# Patient Record
Sex: MALE | Race: WHITE | Employment: OTHER | URBAN - METROPOLITAN AREA
[De-identification: names, ages, dates, MRNs, and addresses within clinical notes are randomized per-mention and may not be internally consistent; named-entity substitution may affect disease eponyms.]

---

## 2021-05-21 ENCOUNTER — APPOINTMENT (OUTPATIENT)
Dept: CT IMAGING | Age: 71
DRG: 853 | End: 2021-05-21
Payer: MEDICARE

## 2021-05-21 ENCOUNTER — HOSPITAL ENCOUNTER (EMERGENCY)
Age: 71
Discharge: HOME | DRG: 853 | End: 2021-05-21
Attending: EMERGENCY MEDICINE
Payer: MEDICARE

## 2021-05-21 VITALS
HEIGHT: 70 IN | DIASTOLIC BLOOD PRESSURE: 63 MMHG | HEART RATE: 107 BPM | BODY MASS INDEX: 33.79 KG/M2 | WEIGHT: 236 LBS | RESPIRATION RATE: 16 BRPM | SYSTOLIC BLOOD PRESSURE: 119 MMHG | TEMPERATURE: 98.6 F | OXYGEN SATURATION: 96 %

## 2021-05-21 DIAGNOSIS — E86.0 DEHYDRATION: Primary | ICD-10-CM

## 2021-05-21 DIAGNOSIS — T67.5XXA HEAT EXHAUSTION, INITIAL ENCOUNTER: ICD-10-CM

## 2021-05-21 LAB
ANION GAP SERPL CALCULATED.3IONS-SCNC: 13 MMOL/L (ref 7–16)
ANISOCYTOSIS: ABNORMAL
BASOPHILS ABSOLUTE: 0.01 E9/L (ref 0–0.2)
BASOPHILS RELATIVE PERCENT: 0.2 % (ref 0–2)
BUN BLDV-MCNC: 28 MG/DL (ref 6–23)
CALCIUM SERPL-MCNC: 8.7 MG/DL (ref 8.6–10.2)
CHLORIDE BLD-SCNC: 101 MMOL/L (ref 98–107)
CO2: 22 MMOL/L (ref 22–29)
CREAT SERPL-MCNC: 1 MG/DL (ref 0.7–1.2)
EOSINOPHILS ABSOLUTE: 0.01 E9/L (ref 0.05–0.5)
EOSINOPHILS RELATIVE PERCENT: 0.2 % (ref 0–6)
GFR AFRICAN AMERICAN: >60
GFR NON-AFRICAN AMERICAN: >60 ML/MIN/1.73
GLUCOSE BLD-MCNC: 253 MG/DL (ref 74–99)
HCT VFR BLD CALC: 34.3 % (ref 37–54)
HEMOGLOBIN: 10.3 G/DL (ref 12.5–16.5)
IMMATURE GRANULOCYTES #: 0.03 E9/L
IMMATURE GRANULOCYTES %: 0.5 % (ref 0–5)
LYMPHOCYTES ABSOLUTE: 0.22 E9/L (ref 1.5–4)
LYMPHOCYTES RELATIVE PERCENT: 3.8 % (ref 20–42)
MCH RBC QN AUTO: 23.5 PG (ref 26–35)
MCHC RBC AUTO-ENTMCNC: 30 % (ref 32–34.5)
MCV RBC AUTO: 78.3 FL (ref 80–99.9)
MONOCYTES ABSOLUTE: 0.1 E9/L (ref 0.1–0.95)
MONOCYTES RELATIVE PERCENT: 1.7 % (ref 2–12)
NEUTROPHILS ABSOLUTE: 5.45 E9/L (ref 1.8–7.3)
NEUTROPHILS RELATIVE PERCENT: 93.6 % (ref 43–80)
OVALOCYTES: ABNORMAL
PDW BLD-RTO: 17.5 FL (ref 11.5–15)
PLATELET # BLD: 120 E9/L (ref 130–450)
PMV BLD AUTO: 9.3 FL (ref 7–12)
POIKILOCYTES: ABNORMAL
POLYCHROMASIA: ABNORMAL
POTASSIUM REFLEX MAGNESIUM: 3.7 MMOL/L (ref 3.5–5)
RBC # BLD: 4.38 E12/L (ref 3.8–5.8)
SODIUM BLD-SCNC: 136 MMOL/L (ref 132–146)
TROPONIN: <0.01 NG/ML (ref 0–0.03)
WBC # BLD: 5.8 E9/L (ref 4.5–11.5)

## 2021-05-21 PROCEDURE — 80048 BASIC METABOLIC PNL TOTAL CA: CPT

## 2021-05-21 PROCEDURE — 36415 COLL VENOUS BLD VENIPUNCTURE: CPT

## 2021-05-21 PROCEDURE — 85025 COMPLETE CBC W/AUTO DIFF WBC: CPT

## 2021-05-21 PROCEDURE — 93005 ELECTROCARDIOGRAM TRACING: CPT | Performed by: NURSE PRACTITIONER

## 2021-05-21 PROCEDURE — 2580000003 HC RX 258: Performed by: EMERGENCY MEDICINE

## 2021-05-21 PROCEDURE — 70450 CT HEAD/BRAIN W/O DYE: CPT

## 2021-05-21 PROCEDURE — 84484 ASSAY OF TROPONIN QUANT: CPT

## 2021-05-21 RX ORDER — TAMSULOSIN HYDROCHLORIDE 0.4 MG/1
CAPSULE ORAL
COMMUNITY
Start: 2021-05-02

## 2021-05-21 RX ORDER — LEVOTHYROXINE SODIUM 0.2 MG/1
TABLET ORAL
COMMUNITY
Start: 2021-03-21

## 2021-05-21 RX ORDER — BLOOD SUGAR DIAGNOSTIC
STRIP MISCELLANEOUS
COMMUNITY
Start: 2021-03-21

## 2021-05-21 RX ORDER — 0.9 % SODIUM CHLORIDE 0.9 %
1000 INTRAVENOUS SOLUTION INTRAVENOUS ONCE
Status: COMPLETED | OUTPATIENT
Start: 2021-05-21 | End: 2021-05-21

## 2021-05-21 RX ORDER — ATORVASTATIN CALCIUM 20 MG/1
TABLET, FILM COATED ORAL
COMMUNITY
Start: 2021-04-08

## 2021-05-21 RX ORDER — METFORMIN HYDROCHLORIDE 500 MG/1
TABLET, EXTENDED RELEASE ORAL
COMMUNITY
Start: 2021-02-26

## 2021-05-21 RX ORDER — RAMIPRIL 1.25 MG/1
CAPSULE ORAL
COMMUNITY
Start: 2021-05-09

## 2021-05-21 RX ORDER — LANCETS
EACH MISCELLANEOUS
COMMUNITY
Start: 2021-03-21

## 2021-05-21 RX ORDER — NITROGLYCERIN 0.4 MG/1
0.4 TABLET SUBLINGUAL
COMMUNITY

## 2021-05-21 RX ORDER — ASPIRIN 81 MG/1
81 TABLET ORAL DAILY
COMMUNITY

## 2021-05-21 RX ADMIN — SODIUM CHLORIDE 1000 ML: 9 INJECTION, SOLUTION INTRAVENOUS at 19:05

## 2021-05-21 ASSESSMENT — ENCOUNTER SYMPTOMS
COUGH: 0
SHORTNESS OF BREATH: 0
DIARRHEA: 0
ABDOMINAL PAIN: 0
ALLERGIC/IMMUNOLOGIC NEGATIVE: 1
SORE THROAT: 0
VOMITING: 0
EYE PAIN: 0
BACK PAIN: 0

## 2021-05-21 NOTE — ED NOTES
FIRST PROVIDER CONTACT ASSESSMENT NOTE      Department of Emergency Medicine   5/21/21  5:48 PM EDT    Chief Complaint: Loss of Consciousness (states LOC around 14:30 after spending several hours in sun today )      History of Present Illness:   Viral Araya is a 79 y.o. male who presents to the ED for syncopal episode. He was working outside for several hours. He states he fell in the yard. Unknown head strike. Patient's family member in triage states he was somewhat confused. He is alert and oriented x4 in triage. Denies any complaints besides being tired. Medical History:  has no past medical history on file. Surgical History:  has no past surgical history on file. Social History:    Family History: family history is not on file. *ALLERGIES*     Patient has no known allergies.      Physical Exam:      VS:  BP (!) 106/57   Pulse 107   Temp 98.6 °F (37 °C) (Temporal)   Resp 16   Ht 5' 9.5\" (1.765 m)   Wt 236 lb (107 kg)   SpO2 95%   BMI 34.35 kg/m²      Initial Plan of Care:  Initiate Treatment-Testing, Proceed toTreatment Area When Bed Available for ED Attending/MLP to Continue Care    -----------------END OF FIRST PROVIDER CONTACT ASSESSMENT NOTE--------------  Electronically signed by ANABELL Wasserman CNP   DD: 5/21/21             ANABELL Wasserman CNP  05/21/21 1749

## 2021-05-22 LAB
EKG ATRIAL RATE: 103 BPM
EKG P AXIS: 48 DEGREES
EKG P-R INTERVAL: 190 MS
EKG Q-T INTERVAL: 324 MS
EKG QRS DURATION: 80 MS
EKG QTC CALCULATION (BAZETT): 424 MS
EKG R AXIS: 7 DEGREES
EKG T AXIS: 39 DEGREES
EKG VENTRICULAR RATE: 103 BPM

## 2021-05-22 PROCEDURE — 93010 ELECTROCARDIOGRAM REPORT: CPT | Performed by: INTERNAL MEDICINE

## 2021-05-23 ENCOUNTER — APPOINTMENT (OUTPATIENT)
Dept: CT IMAGING | Age: 71
DRG: 853 | End: 2021-05-23
Payer: MEDICARE

## 2021-05-23 ENCOUNTER — APPOINTMENT (OUTPATIENT)
Dept: GENERAL RADIOLOGY | Age: 71
DRG: 853 | End: 2021-05-23
Payer: MEDICARE

## 2021-05-23 ENCOUNTER — HOSPITAL ENCOUNTER (INPATIENT)
Age: 71
LOS: 4 days | Discharge: HOME | DRG: 853 | End: 2021-05-27
Attending: EMERGENCY MEDICINE | Admitting: INTERNAL MEDICINE
Payer: MEDICARE

## 2021-05-23 ENCOUNTER — APPOINTMENT (OUTPATIENT)
Dept: ULTRASOUND IMAGING | Age: 71
DRG: 853 | End: 2021-05-23
Payer: MEDICARE

## 2021-05-23 DIAGNOSIS — K85.10 GALLSTONE PANCREATITIS: ICD-10-CM

## 2021-05-23 DIAGNOSIS — K81.9 CHOLECYSTITIS: Primary | ICD-10-CM

## 2021-05-23 LAB
ACETAMINOPHEN LEVEL: <5 MCG/ML (ref 10–30)
ALBUMIN SERPL-MCNC: 3.5 G/DL (ref 3.5–5.2)
ALP BLD-CCNC: 263 U/L (ref 40–129)
ALT SERPL-CCNC: 376 U/L (ref 0–40)
AMMONIA: 20.5 UMOL/L (ref 16–60)
ANION GAP SERPL CALCULATED.3IONS-SCNC: 8 MMOL/L (ref 7–16)
ANISOCYTOSIS: ABNORMAL
AST SERPL-CCNC: 459 U/L (ref 0–39)
BACTERIA: ABNORMAL /HPF
BASOPHILS ABSOLUTE: 0 E9/L (ref 0–0.2)
BASOPHILS RELATIVE PERCENT: 0 % (ref 0–2)
BILIRUB SERPL-MCNC: 1.8 MG/DL (ref 0–1.2)
BILIRUBIN DIRECT: 1.3 MG/DL (ref 0–0.3)
BILIRUBIN URINE: ABNORMAL
BILIRUBIN, INDIRECT: 0.5 MG/DL (ref 0–1)
BLOOD, URINE: ABNORMAL
BUN BLDV-MCNC: 25 MG/DL (ref 6–23)
BURR CELLS: ABNORMAL
CALCIUM SERPL-MCNC: 9 MG/DL (ref 8.6–10.2)
CHLORIDE BLD-SCNC: 103 MMOL/L (ref 98–107)
CHOLESTEROL, TOTAL: 86 MG/DL (ref 0–199)
CLARITY: CLEAR
CO2: 23 MMOL/L (ref 22–29)
COLOR: YELLOW
CREAT SERPL-MCNC: 0.9 MG/DL (ref 0.7–1.2)
EOSINOPHILS ABSOLUTE: 0.14 E9/L (ref 0.05–0.5)
EOSINOPHILS RELATIVE PERCENT: 1.7 % (ref 0–6)
ETHANOL: <10 MG/DL (ref 0–0.08)
GFR AFRICAN AMERICAN: >60
GFR NON-AFRICAN AMERICAN: >60 ML/MIN/1.73
GLUCOSE BLD-MCNC: 215 MG/DL (ref 74–99)
GLUCOSE URINE: 250 MG/DL
HBA1C MFR BLD: 7.4 % (ref 4–5.6)
HCT VFR BLD CALC: 35.8 % (ref 37–54)
HDLC SERPL-MCNC: 26 MG/DL
HEMOGLOBIN: 10.9 G/DL (ref 12.5–16.5)
KETONES, URINE: ABNORMAL MG/DL
LACTIC ACID: 2.9 MMOL/L (ref 0.5–2.2)
LDL CHOLESTEROL CALCULATED: 47 MG/DL (ref 0–99)
LEUKOCYTE ESTERASE, URINE: NEGATIVE
LIPASE: 517 U/L (ref 13–60)
LYMPHOCYTES ABSOLUTE: 0.34 E9/L (ref 1.5–4)
LYMPHOCYTES RELATIVE PERCENT: 3.5 % (ref 20–42)
MCH RBC QN AUTO: 23.4 PG (ref 26–35)
MCHC RBC AUTO-ENTMCNC: 30.4 % (ref 32–34.5)
MCV RBC AUTO: 76.8 FL (ref 80–99.9)
MONOCYTES ABSOLUTE: 0 E9/L (ref 0.1–0.95)
MONOCYTES RELATIVE PERCENT: 0 % (ref 2–12)
NEUTROPHILS ABSOLUTE: 8.08 E9/L (ref 1.8–7.3)
NEUTROPHILS RELATIVE PERCENT: 94.8 % (ref 43–80)
NITRITE, URINE: NEGATIVE
NUCLEATED RED BLOOD CELLS: 0 /100 WBC
OVALOCYTES: ABNORMAL
PDW BLD-RTO: 18 FL (ref 11.5–15)
PH UA: 6 (ref 5–9)
PLATELET # BLD: 101 E9/L (ref 130–450)
PMV BLD AUTO: 9.9 FL (ref 7–12)
POIKILOCYTES: ABNORMAL
POLYCHROMASIA: ABNORMAL
POTASSIUM SERPL-SCNC: 3.8 MMOL/L (ref 3.5–5)
PROTEIN UA: 30 MG/DL
RBC # BLD: 4.66 E12/L (ref 3.8–5.8)
RBC UA: ABNORMAL /HPF (ref 0–2)
SALICYLATE, SERUM: <0.3 MG/DL (ref 0–30)
SARS-COV-2, NAAT: NOT DETECTED
SCHISTOCYTES: ABNORMAL
SODIUM BLD-SCNC: 134 MMOL/L (ref 132–146)
SPECIFIC GRAVITY UA: 1.02 (ref 1–1.03)
T4 FREE: 1.06 NG/DL (ref 0.93–1.7)
TOTAL CK: 134 U/L (ref 20–200)
TOTAL PROTEIN: 6.3 G/DL (ref 6.4–8.3)
TRICYCLIC ANTIDEPRESSANTS SCREEN SERUM: NEGATIVE NG/ML
TRIGL SERPL-MCNC: 65 MG/DL (ref 0–149)
TROPONIN: <0.01 NG/ML (ref 0–0.03)
TSH SERPL DL<=0.05 MIU/L-ACNC: 2 UIU/ML (ref 0.27–4.2)
UROBILINOGEN, URINE: >=8 E.U./DL
VLDLC SERPL CALC-MCNC: 13 MG/DL
WBC # BLD: 8.5 E9/L (ref 4.5–11.5)
WBC UA: ABNORMAL /HPF (ref 0–5)

## 2021-05-23 PROCEDURE — 87088 URINE BACTERIA CULTURE: CPT

## 2021-05-23 PROCEDURE — 87635 SARS-COV-2 COVID-19 AMP PRB: CPT

## 2021-05-23 PROCEDURE — 80307 DRUG TEST PRSMV CHEM ANLYZR: CPT

## 2021-05-23 PROCEDURE — 76705 ECHO EXAM OF ABDOMEN: CPT

## 2021-05-23 PROCEDURE — 2500000003 HC RX 250 WO HCPCS: Performed by: EMERGENCY MEDICINE

## 2021-05-23 PROCEDURE — 6360000002 HC RX W HCPCS: Performed by: EMERGENCY MEDICINE

## 2021-05-23 PROCEDURE — 87040 BLOOD CULTURE FOR BACTERIA: CPT

## 2021-05-23 PROCEDURE — 80061 LIPID PANEL: CPT

## 2021-05-23 PROCEDURE — 2500000003 HC RX 250 WO HCPCS: Performed by: INTERNAL MEDICINE

## 2021-05-23 PROCEDURE — 2580000003 HC RX 258: Performed by: EMERGENCY MEDICINE

## 2021-05-23 PROCEDURE — 84439 ASSAY OF FREE THYROXINE: CPT

## 2021-05-23 PROCEDURE — 99223 1ST HOSP IP/OBS HIGH 75: CPT | Performed by: INTERNAL MEDICINE

## 2021-05-23 PROCEDURE — 1200000000 HC SEMI PRIVATE

## 2021-05-23 PROCEDURE — 87150 DNA/RNA AMPLIFIED PROBE: CPT

## 2021-05-23 PROCEDURE — 6370000000 HC RX 637 (ALT 250 FOR IP): Performed by: INTERNAL MEDICINE

## 2021-05-23 PROCEDURE — 99284 EMERGENCY DEPT VISIT MOD MDM: CPT

## 2021-05-23 PROCEDURE — 71045 X-RAY EXAM CHEST 1 VIEW: CPT

## 2021-05-23 PROCEDURE — 80179 DRUG ASSAY SALICYLATE: CPT

## 2021-05-23 PROCEDURE — 80053 COMPREHEN METABOLIC PANEL: CPT

## 2021-05-23 PROCEDURE — 82077 ASSAY SPEC XCP UR&BREATH IA: CPT

## 2021-05-23 PROCEDURE — 96374 THER/PROPH/DIAG INJ IV PUSH: CPT

## 2021-05-23 PROCEDURE — 87077 CULTURE AEROBIC IDENTIFY: CPT

## 2021-05-23 PROCEDURE — 6360000004 HC RX CONTRAST MEDICATION: Performed by: RADIOLOGY

## 2021-05-23 PROCEDURE — 82550 ASSAY OF CK (CPK): CPT

## 2021-05-23 PROCEDURE — 82248 BILIRUBIN DIRECT: CPT

## 2021-05-23 PROCEDURE — 84484 ASSAY OF TROPONIN QUANT: CPT

## 2021-05-23 PROCEDURE — 87186 SC STD MICRODIL/AGAR DIL: CPT

## 2021-05-23 PROCEDURE — 82140 ASSAY OF AMMONIA: CPT

## 2021-05-23 PROCEDURE — 70450 CT HEAD/BRAIN W/O DYE: CPT

## 2021-05-23 PROCEDURE — 85025 COMPLETE CBC W/AUTO DIFF WBC: CPT

## 2021-05-23 PROCEDURE — 83605 ASSAY OF LACTIC ACID: CPT

## 2021-05-23 PROCEDURE — 83690 ASSAY OF LIPASE: CPT

## 2021-05-23 PROCEDURE — 84443 ASSAY THYROID STIM HORMONE: CPT

## 2021-05-23 PROCEDURE — 83036 HEMOGLOBIN GLYCOSYLATED A1C: CPT

## 2021-05-23 PROCEDURE — 80143 DRUG ASSAY ACETAMINOPHEN: CPT

## 2021-05-23 PROCEDURE — 81001 URINALYSIS AUTO W/SCOPE: CPT

## 2021-05-23 PROCEDURE — APPSS180 APP SPLIT SHARED TIME > 60 MINUTES: Performed by: NURSE PRACTITIONER

## 2021-05-23 PROCEDURE — 74177 CT ABD & PELVIS W/CONTRAST: CPT

## 2021-05-23 PROCEDURE — 2580000003 HC RX 258: Performed by: INTERNAL MEDICINE

## 2021-05-23 RX ORDER — RAMIPRIL 1.25 MG/1
1.25 CAPSULE ORAL DAILY
Status: DISCONTINUED | OUTPATIENT
Start: 2021-05-23 | End: 2021-05-27 | Stop reason: HOSPADM

## 2021-05-23 RX ORDER — ACETAMINOPHEN 325 MG/1
650 TABLET ORAL EVERY 6 HOURS PRN
Status: DISCONTINUED | OUTPATIENT
Start: 2021-05-23 | End: 2021-05-27 | Stop reason: HOSPADM

## 2021-05-23 RX ORDER — POLYETHYLENE GLYCOL 3350 17 G/17G
17 POWDER, FOR SOLUTION ORAL DAILY PRN
Status: DISCONTINUED | OUTPATIENT
Start: 2021-05-23 | End: 2021-05-27 | Stop reason: HOSPADM

## 2021-05-23 RX ORDER — ACETAMINOPHEN 650 MG/1
650 SUPPOSITORY RECTAL EVERY 6 HOURS PRN
Status: DISCONTINUED | OUTPATIENT
Start: 2021-05-23 | End: 2021-05-27 | Stop reason: HOSPADM

## 2021-05-23 RX ORDER — TAMSULOSIN HYDROCHLORIDE 0.4 MG/1
0.4 CAPSULE ORAL DAILY
Status: DISCONTINUED | OUTPATIENT
Start: 2021-05-23 | End: 2021-05-27 | Stop reason: HOSPADM

## 2021-05-23 RX ORDER — 0.9 % SODIUM CHLORIDE 0.9 %
1000 INTRAVENOUS SOLUTION INTRAVENOUS ONCE
Status: COMPLETED | OUTPATIENT
Start: 2021-05-23 | End: 2021-05-23

## 2021-05-23 RX ORDER — SODIUM CHLORIDE 9 MG/ML
25 INJECTION, SOLUTION INTRAVENOUS PRN
Status: DISCONTINUED | OUTPATIENT
Start: 2021-05-23 | End: 2021-05-27 | Stop reason: HOSPADM

## 2021-05-23 RX ORDER — LEVOTHYROXINE SODIUM 0.1 MG/1
200 TABLET ORAL DAILY
Status: DISCONTINUED | OUTPATIENT
Start: 2021-05-23 | End: 2021-05-27 | Stop reason: HOSPADM

## 2021-05-23 RX ORDER — SODIUM CHLORIDE 0.9 % (FLUSH) 0.9 %
5-40 SYRINGE (ML) INJECTION EVERY 12 HOURS SCHEDULED
Status: DISCONTINUED | OUTPATIENT
Start: 2021-05-23 | End: 2021-05-27 | Stop reason: HOSPADM

## 2021-05-23 RX ORDER — SODIUM CHLORIDE 0.9 % (FLUSH) 0.9 %
5-40 SYRINGE (ML) INJECTION PRN
Status: DISCONTINUED | OUTPATIENT
Start: 2021-05-23 | End: 2021-05-27 | Stop reason: HOSPADM

## 2021-05-23 RX ORDER — PROMETHAZINE HYDROCHLORIDE 25 MG/1
12.5 TABLET ORAL EVERY 6 HOURS PRN
Status: DISCONTINUED | OUTPATIENT
Start: 2021-05-23 | End: 2021-05-27 | Stop reason: HOSPADM

## 2021-05-23 RX ORDER — ONDANSETRON 2 MG/ML
4 INJECTION INTRAMUSCULAR; INTRAVENOUS EVERY 6 HOURS PRN
Status: DISCONTINUED | OUTPATIENT
Start: 2021-05-23 | End: 2021-05-27 | Stop reason: HOSPADM

## 2021-05-23 RX ORDER — DEXTROSE, SODIUM CHLORIDE, AND POTASSIUM CHLORIDE 5; .45; .15 G/100ML; G/100ML; G/100ML
INJECTION INTRAVENOUS CONTINUOUS
Status: DISPENSED | OUTPATIENT
Start: 2021-05-23 | End: 2021-05-24

## 2021-05-23 RX ADMIN — TAMSULOSIN HYDROCHLORIDE 0.4 MG: 0.4 CAPSULE ORAL at 11:51

## 2021-05-23 RX ADMIN — Medication 10 ML: at 11:52

## 2021-05-23 RX ADMIN — METRONIDAZOLE 500 MG: 500 INJECTION, SOLUTION INTRAVENOUS at 08:22

## 2021-05-23 RX ADMIN — SODIUM CHLORIDE 1000 ML: 9 INJECTION, SOLUTION INTRAVENOUS at 05:14

## 2021-05-23 RX ADMIN — METOPROLOL TARTRATE 25 MG: 25 TABLET, FILM COATED ORAL at 21:23

## 2021-05-23 RX ADMIN — IOPAMIDOL 75 ML: 755 INJECTION, SOLUTION INTRAVENOUS at 05:45

## 2021-05-23 RX ADMIN — METOPROLOL TARTRATE 25 MG: 25 TABLET, FILM COATED ORAL at 11:52

## 2021-05-23 RX ADMIN — CEFTRIAXONE 1000 MG: 1 INJECTION, POWDER, FOR SOLUTION INTRAMUSCULAR; INTRAVENOUS at 07:31

## 2021-05-23 RX ADMIN — ACETAMINOPHEN 650 MG: 325 TABLET ORAL at 11:49

## 2021-05-23 RX ADMIN — ACETAMINOPHEN 650 MG: 325 TABLET ORAL at 18:43

## 2021-05-23 RX ADMIN — RAMIPRIL 1.25 MG: 1.25 CAPSULE ORAL at 11:58

## 2021-05-23 RX ADMIN — LEVOTHYROXINE SODIUM 200 MCG: 100 TABLET ORAL at 11:50

## 2021-05-23 RX ADMIN — POTASSIUM CHLORIDE, DEXTROSE MONOHYDRATE AND SODIUM CHLORIDE: 150; 5; 450 INJECTION, SOLUTION INTRAVENOUS at 10:21

## 2021-05-23 ASSESSMENT — PAIN DESCRIPTION - ORIENTATION: ORIENTATION: MID

## 2021-05-23 ASSESSMENT — PAIN DESCRIPTION - FREQUENCY: FREQUENCY: INTERMITTENT

## 2021-05-23 ASSESSMENT — PAIN SCALES - GENERAL
PAINLEVEL_OUTOF10: 0
PAINLEVEL_OUTOF10: 5

## 2021-05-23 ASSESSMENT — PAIN DESCRIPTION - LOCATION: LOCATION: ABDOMEN

## 2021-05-23 ASSESSMENT — PAIN DESCRIPTION - PROGRESSION: CLINICAL_PROGRESSION: NOT CHANGED

## 2021-05-23 ASSESSMENT — PAIN DESCRIPTION - PAIN TYPE: TYPE: ACUTE PAIN

## 2021-05-23 ASSESSMENT — PAIN DESCRIPTION - ONSET: ONSET: ON-GOING

## 2021-05-23 ASSESSMENT — PAIN DESCRIPTION - DESCRIPTORS: DESCRIPTORS: CRAMPING;CRUSHING;DISCOMFORT

## 2021-05-23 NOTE — H&P
[facility] CARE AT [facility]   History and Physical      CHIEF COMPLAINT: Abdominal discomfort    History of Present Illness:  79 y.o. male with a history of obesity, patient was planned for gastric bypass which he had about 26 years ago, at that time he also had placement of IVC filter, he did have evidence of DVT which was treated and underwent surgery 3 months later, smoker until 1985, also with diabetes, hypertension, hypothyroidism, did have CABG 3 years ago, he also had needed stent for his PVD on the left side, originally from [facility], presented to ED 2 days ago, at that time he had mowed lawn and he was diagnosed with heat exhaustion, treated and improved with IV fluids. Did have difficulty yesterday, more after dinner, again came back to ER today, work-up had shown evidence of cholelithiasis, renal stones, bladder stones, evidence of pericholecystic fluid noted on CT. At that time patient was referred for further inpatient management. Informant(s) for H&P: Patient who is very hard of hearing, wife present in the room who were given most of the information. REVIEW OF SYSTEMS:  no fevers, chills, cp, sob, n/v, ha, vision/hearing changes, wt changes, hot/cold flashes, other open skin lesions, diarrhea, constipation, dysuria/hematuria unless noted in HPI. Complete ROS performed with the patient and is otherwise negative. PMH:  Past Medical History:   Diagnosis Date    Diabetes mellitus ([address]     Thyroid disease        Surgical History:  Right TKR    Medications Prior to Admission:    Prior to Admission medications    Medication Sig Start Date End Date Taking?  Authorizing Provider   aspirin 81 MG EC tablet Take 81 mg by mouth daily    Historical Provider, MD   atorvastatin (LIPITOR) 20 MG tablet TAKE 1 TABLET (20 MG TOTAL) BY MOUTH ONCE A DAY 4/8/21   Historical Provider, MD RAMOS GUIDE strip TEST AS DIRECTED 3 TIMES A DAY 3/21/21   Historical Provider, MD Ramos FastClix Lancets MISC TEST 3 TIMES A DAY(NEED MED B CARD) 3/21/21   Historical Provider, MD   levothyroxine (SYNTHROID) 200 MCG tablet TAKE 1 TABLET BY MOUTH IN THE MORNING ON AN EMPTY STOMACH ONCE A DAY 3/21/21   Historical Provider, MD   metFORMIN (GLUCOPHAGE-XR) 500 MG extended release tablet TAKE 4 TABLETS BY MOUTH EVERY DAY 2/26/21   Historical Provider, MD   metoprolol tartrate (LOPRESSOR) 25 MG tablet TAKE 1 TABLET BY MOUTH TWICE A DAY 3/11/21   Historical Provider, MD   nitroGLYCERIN (NITROSTAT) 0.4 MG SL tablet Place 0.4 mg under the tongue    Historical Provider, MD   tamsulosin (FLOMAX) 0.4 MG capsule TAKE 1 CAPSULE BY MOUTH EVERY DAY 5/2/21   Historical Provider, MD   ramipril (ALTACE) 1.25 MG capsule TAKE 1 CAPSULE (1.25 MG TOTAL) BY MOUTH ONCE A DAY 5/9/21   Historical Provider, MD       Allergies:    Patient has no known allergies. Social History:    reports that he quit smoking about 36 years ago. He has never used smokeless tobacco. He reports current alcohol use. He reports that he does not use drugs. Family History:   History reviewed. No pertinent family history.     PHYSICAL EXAM:  Vitals:  BP (!) 142/66   Pulse 95   Temp 97.7 °F (36.5 °C) (Infrared)   Resp 18   Ht 5' 10\" (1.778 m)   Wt 236 lb (107 kg)   SpO2 92%   BMI 33.86 kg/m²   General Appearance: alert and oriented to person, place and time, well-developed and well-nourished, in no acute distress  Skin: warm and dry, no rash or erythema  Head: normocephalic and atraumatic  Eyes: pupils equal, round, and reactive to light, extraocular eye movements intact, conjunctivae normal  ENT: tympanic membrane, external ear and ear canal normal bilaterally, oropharynx clear and moist with normal mucous membranes  Neck: neck supple and non tender without mass, no thyromegaly or thyroid nodules, no cervical lymphadenopathy   Pulmonary/Chest: clear to auscultation bilaterally- no wheezes, rales or rhonchi, normal air movement, no respiratory The visualized paranasal sinuses and mastoid air cells demonstrate no acute abnormality. SOFT TISSUES/SKULL:  No acute abnormality of the visualized skull or soft tissues. No acute intracranial abnormality. MRI would be useful if symptoms persist.     CT ABDOMEN PELVIS W IV CONTRAST Additional Contrast? None    Result Date: 5/23/2021  EXAMINATION: CT OF THE ABDOMEN AND PELVIS WITH CONTRAST 5/23/2021 5:45 am TECHNIQUE: CT of the abdomen and pelvis was performed with the administration of intravenous contrast. Multiplanar reformatted images are provided for review. Dose modulation, iterative reconstruction, and/or weight based adjustment of the mA/kV was utilized to reduce the radiation dose to as low as reasonably achievable. COMPARISON: None. HISTORY: ORDERING SYSTEM PROVIDED HISTORY: Confusion, transaminitis TECHNOLOGIST PROVIDED HISTORY: Reason for exam:->Confusion, transaminitis Additional Contrast?->None Decision Support Exception - unselect if not a suspected or confirmed emergency medical condition->Emergency Medical Condition (MA) FINDINGS: Numerous calcified stones in the gallbladder. There is also minimal pericholecystic fluid with some questionable mild gallbladder wall thickening. Liver is homogeneous. Spleen is normal in size. Pancreas is unremarkable. No adrenal mass. No hydronephrosis. Nonobstructing bilateral renal stones. The largest stone is in the left kidney measuring 1.6 cm. Largest right renal stone measures 3 mm. Inferior vena caval filter is identified. Scattered vascular calcifications. Assessment of bowel is limited without oral contrast.  No bowel obstruction. Patient is status post gastric bypass. Appendix is normal.  No obvious acute bowel related inflammatory change. There is a stent involving the left common iliac artery. Multiple stones are present within the urinary bladder posteriorly towards the right. The largest of these measures 1.6 cm.  Prostate gland is borderline prominent. Calcifications in the pelvis are most consistent with phleboliths. No free fluid, free air or localized fluid collection. Lung bases are clear. Sternotomy wires. Degenerative changes are present in the spine and pelvis. Cholelithiasis. Pericholecystic fluid, concerning for acute cholecystitis. Surgical consultation recommended. Stones within the urinary bladder. Nonobstructing bilateral renal stones. Other chronic appearing findings. XR CHEST PORTABLE    Result Date: 5/23/2021  EXAMINATION: ONE XRAY VIEW OF THE CHEST 5/23/2021 5:31 am COMPARISON: None. HISTORY: ORDERING SYSTEM PROVIDED HISTORY: fever TECHNOLOGIST PROVIDED HISTORY: Reason for exam:->fever FINDINGS: Sternotomy wires. Cardiac silhouette is borderline prominent. Lung volumes are diminished. No pneumothorax, consolidation or effusion identified. Diminished lung volume without acute process otherwise identified. PA and lateral views would be useful for further assessment, if symptoms persist.       EKG: Sinus rhythm on 21st    ASSESSMENT  and  PLAN:    1. Abdominal discomfort, cholelithiasis, suspected cholecystitis. Surgery was already consulted from ER. Likely patient may need HIDA, will defer to surgery at this time. 2. Kidney stones, not new to the patient, bladder stones, not an active issue. 3. CAD, s/p CABG, patient has cardiology however is from [facility]. 4. Diabetes, will hold Metformin, continue sliding scale. 5. Hypothyroidism, will continue home dose of Synthroid  6. Hyperlipidemia, patient is on statins. 7. BPH, stable on Flomax, continued. 8. Lactic acidosis, recheck lactic acid in 6 hours, continue IV fluids   9. Possible pancreatitis, elevated lipase, no abnormality mentioned on CT abdomen in pancreatic area, however surgery was also consulted, will recheck lipase. 10. S/p gastric bypass. 11. Abnormal LFTs, more related to cholelithiasis.     Code Status: Full  DVT prophylaxis: SCDs, till plan clear from surgery. Electronically signed by Rita Mc MD on 5/23/2021 at 7:49 AM      NOTE: This report was transcribed using voice recognition software. Every effort was made to ensure accuracy; however, inadvertent computerized transcription errors may be present.

## 2021-05-23 NOTE — CONSULTS
joint complaints  · Integumentary: Denies rash, hives or pruritis   · Neurological: + AMS. Denies dizziness, headaches or seizures. No numbness or tingling  · Psychiatric: Denies anxiety or depression. · Endocrine: Denies temperature intolerance. No recent weight change. .  · Hematologic/Lymphatic: Denies abnormal bruising or bleeding. No swollen lymph nodes    PHYSICAL EXAM:   BP (!) 150/74   Pulse 87   Temp 98.9 °F (37.2 °C) (Oral)   Resp 17   Ht 5' 10\" (1.778 m)   Wt 236 lb (107 kg)   SpO2 99%   BMI 33.86 kg/m²   CONST:  Well developed,  male who appears of stated age. Awake, alert and cooperative. No apparent distress. HEENT:   Head- Normocephalic, atraumatic   Eyes- Conjunctivae pink, anicteric  Throat- Oral mucosa pink and moist  Neck-  No stridor, trachea midline, no jugular venous distention. No carotid bruit. CHEST: Chest symmetrical and non-tender to palpation. No accessory muscle use or intercostal retractions  RESPIRATORY: Lung sounds - clear throughout fields   CARDIOVASCULAR:     Heart Ausculation- Regular rate and rhythm, no murmur. No s3, s4 or rub   PV: No lower extremity edema. No varicosities. Pedal pulses palpable, no clubbing or cyanosis   ABDOMEN: Soft, non-tender to light palpation. Bowel sounds present. No palpable masses; no abdominal bruit  MS: Good muscle strength and tone. No atrophy or abnormal movements. : Deferred  SKIN: Pale, hot, moist.  No statis dermatitis or ulcers   NEURO / PSYCH: Oriented to person, place and time. Speech clear and appropriate. Follows all commands. Flat affect     DATA:    ECG  As per Dr Desiree Martin interpretation  Tele strips: Non monitored    Diagnostic:    CT Abdomen/Pelvis W IV 5/23/2021: Cholelithiasis.  Pericholecystic fluid, concerning for acute cholecystitis. Surgical consultation recommended.    Stones within the urinary bladder.  Nonobstructing bilateral renal stones    CXR 5/23/2021: Diminished lung volume without acute process otherwise identified. CT head 5/23/2021: No acute intracranial abnormality. Labs:   CBC:   Recent Labs     05/21/21 1835 05/23/21 0427   WBC 5.8 8.5   HGB 10.3* 10.9*   HCT 34.3* 35.8*   * 101*     BMP:   Recent Labs     05/21/21 1835 05/23/21 0427    134   K 3.7 3.8   CO2 22 23   BUN 28* 25*   CREATININE 1.0 0.9   LABGLOM >60 >60   CALCIUM 8.7 9.0     CARDIAC ENZYMES:  Recent Labs     05/21/21 1835 05/23/21 0427   CKTOTAL  --  134   TROPONINI <0.01 <0.01     LIVER PROFILE:  Recent Labs     05/23/21 0427   *   *   LABALBU 3.5   Electronically signed by Germán Alcazar. GENEVIEVE Rivera on 5/23/2021 at 2:57 PM     The above documentation has been prepared under my direction and personally reviewed by me in its entirety. I confirm that the note above accurately reflects all work, treatment, procedures, and medical decision making performed by me.     The patient's history was independently obtained. The patient was independently examined. Electrocardiogram, prior and present cardiovascular assessment, and laboratory studies were reviewed.     The patient is a 66-year-old white male with no association to [facility] and previous care provided by his primary cardiologist in [place], [place]. He has a known history of coronary atherosclerosis with surgical revascularization in July, 2019 with a left internal mammary graft to left anterior descending and saphenous vein grafts to the diagonal branch of the left anterior descending, ramus intermedius branch of the circumflex and circumflex marginal.  He additionally has a history of hypertension, diabetes, hyperlipidemia and peripheral arterial disease. Subsequent to revascularization, he has resumed all normal activities with functional capabilities in excess of 5 METs and no active cardiovascular symptoms.   He is presently visiting locally with evaluation in the emergency room 48 hours earlier following a potential loss of consciousness following activity under warm environmental circumstances with a presumptive diagnosis of volume depletion and stabilization followed by discharge. He returned 48 hours later following the development of diffuse upper quadrant abdominal discomfort with evidence of abnormalities of hepatic transaminase levels, alkaline phosphatase levels and elevated bilirubin levels as well as that of lipase levels with an abdominal CT scan suggesting evidence of cholelithiasis. He denies any active cardiovascular symptomatology with a resting electrocardiogram demonstrating evidence of sinus tachycardia with nonspecific ST changes upon review and a chest x-ray demonstrating no evidence of cardiomegaly or infiltrate. Cardiac biomarkers at the time of his initial presentation presently were unremarkable.     At the time of evaluation, his medications and allergies were reviewed as well as that of his past medical history and review of systems as documented.     On examination, he presently appears in no discomfort nor distress and is hemodynamically stable vital signs as documented. Jugular venous pressure appears normal with no identified carotid bruits. Lung fields are clear to auscultation. Cardiac examination is notable for a regular rate and rhythm with no gallop rhythm or cardiac murmur. A benign abdominal examination is present with no present specific tenderness no peripheral edema is noted.     Diagnostic Assessment and Plan: On a clinical basis, the patient presently presents with evidence of abdominal discomfort and evidence suggestive of cholelithiasis with ongoing surgical assessment of potential cholecystitis. He is compensated from a cardiovascular standpoint and if surgical intervention is indicated would be an acceptable candidate to proceed without needs of additional cardiovascular assessment.   In light of his acute illness, at least presently withholding of his angiotensin-converting enzyme inhibitor would be advisable to reduce risk of acute kidney injury as well as withholding of his Metformin pending further assessment regarding surgical needs. No additional cardiovascular assessment is indicated and if surgery is indeed indicated, the administration of his beta-blocker with a sip of water would be advisable on the morning of his surgical procedure as well as that of cautious periprocedural fluid administration to reduce risk of future atherosclerotic development. Additional recommendations will be deferred to the general surgical service in addition to that of primary care. On long-term basis, ongoing aggressive risk factor modification of blood pressure, diabetes and serum lipids will be essential to reducing risk of future atherosclerotic development. Following discharge, he is cardiovascular care will be returned to his local primary cardiologist.     Thank you for allowing me to participate in your patient's care. Please feel free to contact me if you have any questions or concerns.     Jorge Yeh, [address] [facility]

## 2021-05-23 NOTE — CONSULTS
The above documentation has been prepared under my direction and personally reviewed by me in its entirety. I confirm that the note above accurately reflects all work, treatment, procedures, and medical decision making performed by me. The patient's history was independently obtained. The patient was independently examined. Electrocardiogram, prior and present cardiovascular assessment, and laboratory studies were reviewed. The patient is a 68-year-old white male with no association to [facility] and previous care provided by his primary cardiologist in [place], [place]. He has a known history of coronary atherosclerosis with surgical revascularization in July, 2019 with a left internal mammary graft to left anterior descending and saphenous vein grafts to the diagonal branch of the left anterior descending, ramus intermedius branch of the circumflex and circumflex marginal.  He additionally has a history of hypertension, diabetes, hyperlipidemia and peripheral arterial disease. Subsequent to revascularization, he has resumed all normal activities with functional capabilities in excess of 5 METs and no active cardiovascular symptoms. He is presently visiting locally with evaluation in the emergency room 48 hours earlier following a potential loss of consciousness following activity under warm environmental circumstances with a presumptive diagnosis of volume depletion and stabilization followed by discharge. He returned 48 hours later following the development of diffuse upper quadrant abdominal discomfort with evidence of abnormalities of hepatic transaminase levels, alkaline phosphatase levels and elevated bilirubin levels as well as that of lipase levels with an abdominal CT scan suggesting evidence of cholelithiasis.   He denies any active cardiovascular symptomatology with a resting electrocardiogram demonstrating evidence of sinus tachycardia with nonspecific ST changes upon review and a chest x-ray demonstrating no evidence of cardiomegaly or infiltrate. Cardiac biomarkers at the time of his initial presentation presently were unremarkable. At the time of evaluation, his medications and allergies were reviewed as well as that of his past medical history and review of systems as documented. On examination, he presently appears in no discomfort nor distress and is hemodynamically stable vital signs as documented. Jugular venous pressure appears normal with no identified carotid bruits. Lung fields are clear to auscultation. Cardiac examination is notable for a regular rate and rhythm with no gallop rhythm or cardiac murmur. A benign abdominal examination is present with no present specific tenderness no peripheral edema is noted. Diagnostic Assessment and Plan: On a clinical basis, the patient presently presents with evidence of abdominal discomfort and evidence suggestive of cholelithiasis with ongoing surgical assessment of potential cholecystitis. He is compensated from a cardiovascular standpoint and if surgical intervention is indicated would be an acceptable candidate to proceed without needs of additional cardiovascular assessment. In light of his acute illness, at least presently withholding of his angiotensin-converting enzyme inhibitor would be advisable to reduce risk of acute kidney injury as well as withholding of his Metformin pending further assessment regarding surgical needs. No additional cardiovascular assessment is indicated and if surgery is indeed indicated, the administration of his beta-blocker with a sip of water would be advisable on the morning of his surgical procedure as well as that of cautious periprocedural fluid administration to reduce risk of future atherosclerotic development. Additional recommendations will be deferred to the general surgical service in addition to that of primary care.   On long-term basis, ongoing aggressive risk factor modification of blood pressure, diabetes and serum lipids will be essential to reducing risk of future atherosclerotic development. Following discharge, he is cardiovascular care will be returned to his local primary cardiologist.    Thank you for allowing me to participate in your patient's care. Please feel free to contact me if you have any questions or concerns. Alda Petersen.  Monet Paget, [address] [facility]

## 2021-05-23 NOTE — CONSULTS
Stress :    Social Connections:     Frequency of Communication with Friends and Family:     Frequency of Social Gatherings with Friends and Family:     Attends Rastafari Services:     Active Member of Clubs or Organizations:     Attends Club or Organization Meetings:     Marital Status:    Intimate Partner Violence:     Fear of Current or Ex-Partner:     Emotionally Abused:     Physically Abused:     Sexually Abused:            Review of Systems  Negative times ten except what was stated in HPI and PMH    Physical exam:   BP (!) 150/74   Pulse 87   Temp 98.9 °F (37.2 °C) (Oral)   Resp 17   Ht 5' 10\" (1.778 m)   Wt 236 lb (107 kg)   SpO2 99%   BMI 33.86 kg/m²   General appearance: AAOx3, NAD  Head: NCAT, PERRLA, EOMI, red conjunctiva  Neck: supple, no masses  Lungs: CTAB, equal chest rise bilateral  Heart: Reg rate  Abdomen: soft, nontender, no guarding/rebound, nondistended, no palpable hernias or defects, midline scar present  Skin: no lesions  Gu: no cva tenderness  Extremities: extremities normal, atraumatic, no cyanosis or edema    Labs:  Results for Dariana Herbert (MRN [id number]) as of 5/23/2021 16:18   Ref.  Range 5/23/2021 04:27   Sodium Latest Ref Range: 132 - 146 mmol/L 134   Potassium Latest Ref Range: 3.5 - 5.0 mmol/L 3.8   Chloride Latest Ref Range: 98 - 107 mmol/L 103   CO2 Latest Ref Range: 22 - 29 mmol/L 23   BUN Latest Ref Range: 6 - 23 mg/dL 25 (H)   Creatinine Latest Ref Range: 0.7 - 1.2 mg/dL 0.9   Anion Gap Latest Ref Range: 7 - 16 mmol/L 8   GFR Non- Latest Ref Range: >=60 mL/min/1.73 >60   GFR African American Unknown >60   Lactic Acid Latest Ref Range: 0.5 - 2.2 mmol/L 2.9 (H)   Glucose Latest Ref Range: 74 - 99 mg/dL 215 (H)   Calcium Latest Ref Range: 8.6 - 10.2 mg/dL 9.0   Total Protein Latest Ref Range: 6.4 - 8.3 g/dL 6.3 (L)   Total CK Latest Ref Range: 20 - 200 U/L 134   Troponin Latest Ref Range: 0.00 - 0.03 ng/mL <0.01   Cholesterol, Total Latest Ref Range: 0 E9/L 0.00 (L)   Eosinophils Absolute Latest Ref Range: 0.05 - 0.50 E9/L 0.14   Basophils Absolute Latest Ref Range: 0.00 - 0.20 E9/L 0.00   Nucleated Red Blood Cells Latest Units: /100 WBC 0.0   Poikilocytes Unknown 2+   Polychromasia Unknown 2+   Schistocytes Unknown 1+   Anisocytosis Unknown 1+   Ovalocytes Unknown 1+   Yazmin Cells Unknown 2+   CULTURE, BLOOD 1 Unknown Rpt   SARS-CoV-2, NAAT Latest Ref Range: Not Detected  Not Detected   Color, UA Latest Ref Range: Straw/Yellow  Yellow   Clarity, UA Latest Ref Range: Clear  Clear   Glucose, UA Latest Ref Range: Negative mg/dL 250 (A)   Bilirubin, Urine Latest Ref Range: Negative  SMALL (A)   Ketones, Urine Latest Ref Range: Negative mg/dL TRACE (A)   Specific [place], UA Latest Ref Range: 1.005 - 1.030  1.025   Blood, Urine Latest Ref Range: Negative  LARGE (A)   pH, UA Latest Ref Range: 5.0 - 9.0  6.0   Protein, UA Latest Ref Range: Negative mg/dL 30 (A)   Urobilinogen, Urine Latest Ref Range: <2.0 E.U./dL >=8.0 (A)   Nitrite, Urine Latest Ref Range: Negative  Negative   Leukocyte Esterase, Urine Latest Ref Range: Negative  Negative   WBC, UA Latest Ref Range: 0 - 5 /HPF 1-3   RBC, UA Latest Ref Range: 0 - 2 /HPF 10-20 (A)   Bacteria, UA Latest Ref Range: None Seen /HPF FEW (A)       Radiology:  CT abdomen/pelvis:   Impression   Cholelithiasis.  Pericholecystic fluid, concerning for acute cholecystitis. Surgical consultation recommended.       Stones within the urinary bladder.  Nonobstructing bilateral renal stones.       Other chronic appearing findings. Assessment:  79 y.o. male with gallstone pancreatitis, hyperbilirubinemia, possible acute cholecystitis, no abdominal pain at this time.     Plan:  Check RUQ U/S  Repeat labs in AM, may need MRCP to evaluate for choledocholithiasis      Tatiana Manjarrez MD  5/23/2021  4:16 PM

## 2021-05-23 NOTE — ED PROVIDER NOTES
This is a 79year old male with a PMH of CABG, DM and Thyroid Disease who presents to the ED For evaluation of altered mental status. Patient was seen about two day prior for confusion and was discharged home with heat exhaustion. Wife states that the patient has been increasingly confused since being at home. Patient does not know what year it is or where he is. THe patient has been having copious amounts of diarrhea and had multiple bowel movements here in the ED. There are no known mitigating or exascerbating facotrs. There are no reported falls or trauma. A complete review of systems and history is limited secondary to the patient's confusion. The history is provided by the patient and the spouse. Review of Systems   Unable to perform ROS: Mental status change        Physical Exam  Vitals and nursing note reviewed. Constitutional:       General: He is not in acute distress. Appearance: He is well-developed. HENT:      Head: Normocephalic and atraumatic. Mouth/Throat:      Mouth: Mucous membranes are dry. Eyes:      Extraocular Movements: Extraocular movements intact. Pupils: Pupils are equal, round, and reactive to light. Neck:      Vascular: No JVD. Cardiovascular:      Rate and Rhythm: Regular rhythm. Tachycardia present. Heart sounds: Murmur heard. Pulmonary:      Effort: Pulmonary effort is normal.   Abdominal:      General: There is no distension. Palpations: Abdomen is soft. Tenderness: There is no guarding or rebound. Hernia: No hernia is present. Comments: RUQ and Epigastric tenderness to palpation   Musculoskeletal:      Cervical back: Normal range of motion and neck supple. Right lower leg: No edema. Left lower leg: No edema. Skin:     General: Skin is warm and dry. Capillary Refill: Capillary refill takes less than 2 seconds. Neurological:      Mental Status: He is alert.       Cranial Nerves: No cranial nerve Chloride 103 98 - 107 mmol/L    CO2 23 22 - 29 mmol/L    Anion Gap 8 7 - 16 mmol/L    Glucose 215 (H) 74 - 99 mg/dL    BUN 25 (H) 6 - 23 mg/dL    CREATININE 0.9 0.7 - 1.2 mg/dL    GFR Non-African American >60 >=60 mL/min/1.73    GFR African American >60     Calcium 9.0 8.6 - 10.2 mg/dL    Total Protein 6.3 (L) 6.4 - 8.3 g/dL    Albumin 3.5 3.5 - 5.2 g/dL    Total Bilirubin 1.8 (H) 0.0 - 1.2 mg/dL    Alkaline Phosphatase 263 (H) 40 - 129 U/L     (H) 0 - 40 U/L     (H) 0 - 39 U/L   CBC auto differential   Result Value Ref Range    WBC 8.5 4.5 - 11.5 E9/L    RBC 4.66 3.80 - 5.80 E12/L    Hemoglobin 10.9 (L) 12.5 - 16.5 g/dL    Hematocrit 35.8 (L) 37.0 - 54.0 %    MCV 76.8 (L) 80.0 - 99.9 fL    MCH 23.4 (L) 26.0 - 35.0 pg    MCHC 30.4 (L) 32.0 - 34.5 %    RDW 18.0 (H) 11.5 - 15.0 fL    Platelets 946 (L) 797 - 450 E9/L    MPV 9.9 7.0 - 12.0 fL    Neutrophils % 94.8 (H) 43.0 - 80.0 %    Lymphocytes % 3.5 (L) 20.0 - 42.0 %    Monocytes % 0.0 (L) 2.0 - 12.0 %    Eosinophils % 1.7 0.0 - 6.0 %    Basophils % 0.0 0.0 - 2.0 %    Neutrophils Absolute 8.08 (H) 1.80 - 7.30 E9/L    Lymphocytes Absolute 0.34 (L) 1.50 - 4.00 E9/L    Monocytes Absolute 0.00 (L) 0.10 - 0.95 E9/L    Eosinophils Absolute 0.14 0.05 - 0.50 E9/L    Basophils Absolute 0.00 0.00 - 0.20 E9/L    nRBC 0.0 /100 WBC    Anisocytosis 1+     Polychromasia 2+     Poikilocytes 2+     Schistocytes 1+     Divide Cells 2+     Ovalocytes 1+    Troponin   Result Value Ref Range    Troponin <0.01 0.00 - 0.03 ng/mL   CK   Result Value Ref Range    Total  20 - 200 U/L   Lipase   Result Value Ref Range    Lipase 517 (H) 13 - 60 U/L   Lactic Acid, Plasma   Result Value Ref Range    Lactic Acid 2.9 (H) 0.5 - 2.2 mmol/L   Serum Drug Screen   Result Value Ref Range    Ethanol Lvl <10 mg/dL    Acetaminophen Level <5.0 (L) 10.0 - 35.6 mcg/mL    Salicylate, Serum <1.6 0.0 - 30.0 mg/dL    TCA Scrn NEGATIVE Cutoff:300 ng/mL   Ammonia   Result Value Ref Range    Ammonia 20.5 16.0 - 60.0 umol/L   Urinalysis with Microscopic   Result Value Ref Range    Color, UA Yellow Straw/Yellow    Clarity, UA Clear Clear    Glucose, Ur 250 (A) Negative mg/dL    Bilirubin Urine SMALL (A) Negative    Ketones, Urine TRACE (A) Negative mg/dL    Specific Gravity, UA 1.025 1.005 - 1.030    Blood, Urine LARGE (A) Negative    pH, UA 6.0 5.0 - 9.0    Protein, UA 30 (A) Negative mg/dL    Urobilinogen, Urine >=8.0 (A) <2.0 E.U./dL    Nitrite, Urine Negative Negative    Leukocyte Esterase, Urine Negative Negative    WBC, UA 1-3 0 - 5 /HPF    RBC, UA 10-20 (A) 0 - 2 /HPF    Bacteria, UA FEW (A) None Seen /HPF   Hepatic Function Panel   Result Value Ref Range    Bilirubin, Direct 1.3 (H) 0.0 - 0.3 mg/dL    Bilirubin, Indirect 0.5 0.0 - 1.0 mg/dL   TSH without Reflex   Result Value Ref Range    TSH 2.000 0.270 - 4.200 uIU/mL   Hemoglobin A1C   Result Value Ref Range    Hemoglobin A1C 7.4 (H) 4.0 - 5.6 %   Lipid Panel   Result Value Ref Range    Cholesterol, Total 86 0 - 199 mg/dL    Triglycerides 65 0 - 149 mg/dL    HDL 26 >40 mg/dL    LDL Calculated 47 0 - 99 mg/dL    VLDL Cholesterol Calculated 13 mg/dL   T4, Free   Result Value Ref Range    T4 Free 1.06 0.93 - 1.70 ng/dL       RADIOLOGY:  US GALLBLADDER RUQ   Final Result   Cholelithiasis. No sonographic evidence of acute cholecystitis. CT ABDOMEN PELVIS W IV CONTRAST Additional Contrast? None   Final Result   Cholelithiasis. Pericholecystic fluid, concerning for acute cholecystitis. Surgical consultation recommended. Stones within the urinary bladder. Nonobstructing bilateral renal stones. Other chronic appearing findings. CT HEAD WO CONTRAST   Final Result   No acute intracranial abnormality. MRI would be useful if symptoms persist.         XR CHEST PORTABLE   Final Result   Diminished lung volume without acute process otherwise identified.   PA and   lateral views would be useful for further assessment, if symptoms persist.                 ------------------------- NURSING NOTES AND VITALS REVIEWED ---------------------------  Date / Time Roomed:  5/23/2021  4:12 AM  ED Bed Assignment:  [id number]    The nursing notes within the ED encounter and vital signs as below have been reviewed. Patient Vitals for the past 24 hrs:   BP Temp Temp src Pulse Resp SpO2 Height Weight   05/23/21 2113 (!) 109/57 97.9 °F (36.6 °C) Temporal 84 16 94 % -- --   05/23/21 1300 -- 98.9 °F (37.2 °C) Oral -- -- -- -- --   05/23/21 1015 (!) 150/74 100.1 °F (37.8 °C) Oral 87 17 99 % -- --   05/23/21 0823 (!) 105/52 99.1 °F (37.3 °C) Oral 83 16 96 % -- --   05/23/21 0609 -- -- -- 95 -- 92 % -- --   05/23/21 0359 (!) 142/66 -- -- -- -- -- -- --   05/23/21 0356 -- 97.7 °F (36.5 °C) Infrared 124 18 96 % 5' 10\" (1.778 m) 236 lb (107 kg)       Oxygen Saturation Interpretation: Normal    ------------------------------------------ PROGRESS NOTES ------------------------------------------  Re-evaluation(s):  Time: 0231  Patients symptoms are improving  Repeat physical examination is improved    Counseling:  I have spoken with the patient and discussed todays results, in addition to providing specific details for the plan of care and counseling regarding the diagnosis and prognosis. Their questions are answered at this time and they are agreeable with the plan of admission.    --------------------------------- ADDITIONAL PROVIDER NOTES ---------------------------------  Consultations:  Spoke with Dr. Herman Bolds  Discussed case. They will admit the patient. This patient's ED course included: a personal history and physicial examination, re-evaluation prior to disposition, multiple bedside re-evaluations, IV medications, cardiac monitoring, continuous pulse oximetry and complex medical decision making and emergency management    This patient has remained hemodynamically stable during their ED course. Diagnosis:  1.  Cholecystitis Disposition:  Patient's disposition: Admit to med/surg floor  Patient's condition is stable.         Noemi Miller DO  05/23/21 [id number]

## 2021-05-23 NOTE — PROGRESS NOTES
Call out to Dr. David Holden for clarification if consult completed and surgery per patient.  Awaiting callback

## 2021-05-24 ENCOUNTER — APPOINTMENT (OUTPATIENT)
Dept: MRI IMAGING | Age: 71
DRG: 853 | End: 2021-05-24
Payer: MEDICARE

## 2021-05-24 ENCOUNTER — ANESTHESIA EVENT (OUTPATIENT)
Dept: OPERATING ROOM | Age: 71
DRG: 853 | End: 2021-05-24
Payer: MEDICARE

## 2021-05-24 LAB
ACINETOBACTER BAUMANNII BY PCR: NOT DETECTED
ALBUMIN SERPL-MCNC: 3 G/DL (ref 3.5–5.2)
ALBUMIN SERPL-MCNC: 3.2 G/DL (ref 3.5–5.2)
ALP BLD-CCNC: 227 U/L (ref 40–129)
ALP BLD-CCNC: 238 U/L (ref 40–129)
ALT SERPL-CCNC: 209 U/L (ref 0–40)
ALT SERPL-CCNC: 244 U/L (ref 0–40)
ANION GAP SERPL CALCULATED.3IONS-SCNC: 8 MMOL/L (ref 7–16)
ANION GAP SERPL CALCULATED.3IONS-SCNC: 8 MMOL/L (ref 7–16)
ANISOCYTOSIS: ABNORMAL
AST SERPL-CCNC: 130 U/L (ref 0–39)
AST SERPL-CCNC: 176 U/L (ref 0–39)
BASOPHILS ABSOLUTE: 0 E9/L (ref 0–0.2)
BASOPHILS RELATIVE PERCENT: 0 % (ref 0–2)
BILIRUB SERPL-MCNC: 0.7 MG/DL (ref 0–1.2)
BILIRUB SERPL-MCNC: 0.8 MG/DL (ref 0–1.2)
BOTTLE TYPE: ABNORMAL
BUN BLDV-MCNC: 15 MG/DL (ref 6–23)
BUN BLDV-MCNC: 18 MG/DL (ref 6–23)
BURR CELLS: ABNORMAL
C-REACTIVE PROTEIN: 10 MG/DL (ref 0–0.4)
CALCIUM SERPL-MCNC: 8.3 MG/DL (ref 8.6–10.2)
CALCIUM SERPL-MCNC: 8.6 MG/DL (ref 8.6–10.2)
CANDIDA ALBICANS BY PCR: NOT DETECTED
CANDIDA GLABRATA BY PCR: NOT DETECTED
CANDIDA KRUSEI BY PCR: NOT DETECTED
CANDIDA PARAPSILOSIS BY PCR: NOT DETECTED
CANDIDA TROPICALIS BY PCR: NOT DETECTED
CARBAPENEM RESISTANCE KPC BY PCR: NOT DETECTED
CHLORIDE BLD-SCNC: 103 MMOL/L (ref 98–107)
CHLORIDE BLD-SCNC: 106 MMOL/L (ref 98–107)
CO2: 23 MMOL/L (ref 22–29)
CO2: 25 MMOL/L (ref 22–29)
CREAT SERPL-MCNC: 0.7 MG/DL (ref 0.7–1.2)
CREAT SERPL-MCNC: 0.8 MG/DL (ref 0.7–1.2)
DOHLE BODIES: ABNORMAL
ENTEROBACTER CLOACAE COMPLEX BY PCR: NOT DETECTED
ENTEROBACTERALES BY PCR: DETECTED
ENTEROCOCCUS BY PCR: NOT DETECTED
EOSINOPHILS ABSOLUTE: 0.08 E9/L (ref 0.05–0.5)
EOSINOPHILS RELATIVE PERCENT: 2 % (ref 0–6)
ESCHERICHIA COLI BY PCR: DETECTED
GFR AFRICAN AMERICAN: >60
GFR AFRICAN AMERICAN: >60
GFR NON-AFRICAN AMERICAN: >60 ML/MIN/1.73
GFR NON-AFRICAN AMERICAN: >60 ML/MIN/1.73
GLUCOSE BLD-MCNC: 157 MG/DL (ref 74–99)
GLUCOSE BLD-MCNC: 182 MG/DL (ref 74–99)
HAEMOPHILUS INFLUENZAE BY PCR: NOT DETECTED
HCT VFR BLD CALC: 34.2 % (ref 37–54)
HEMOGLOBIN: 10.2 G/DL (ref 12.5–16.5)
KLEBSIELLA OXYTOCA BY PCR: NOT DETECTED
KLEBSIELLA PNEUMONIAE GROUP BY PCR: NOT DETECTED
LACTIC ACID: 1.1 MMOL/L (ref 0.5–2.2)
LIPASE: 579 U/L (ref 13–60)
LISTERIA MONOCYTOGENES BY PCR: NOT DETECTED
LYMPHOCYTES ABSOLUTE: 0.12 E9/L (ref 1.5–4)
LYMPHOCYTES RELATIVE PERCENT: 3 % (ref 20–42)
MCH RBC QN AUTO: 23.2 PG (ref 26–35)
MCHC RBC AUTO-ENTMCNC: 29.8 % (ref 32–34.5)
MCV RBC AUTO: 77.7 FL (ref 80–99.9)
MONOCYTES ABSOLUTE: 0.33 E9/L (ref 0.1–0.95)
MONOCYTES RELATIVE PERCENT: 8 % (ref 2–12)
NEISSERIA MENINGITIDIS BY PCR: NOT DETECTED
NEUTROPHILS ABSOLUTE: 3.57 E9/L (ref 1.8–7.3)
NEUTROPHILS RELATIVE PERCENT: 87 % (ref 43–80)
ORDER NUMBER: ABNORMAL
OVALOCYTES: ABNORMAL
PDW BLD-RTO: 18.1 FL (ref 11.5–15)
PLATELET # BLD: 101 E9/L (ref 130–450)
PMV BLD AUTO: 10.3 FL (ref 7–12)
POIKILOCYTES: ABNORMAL
POLYCHROMASIA: ABNORMAL
POTASSIUM REFLEX MAGNESIUM: 4 MMOL/L (ref 3.5–5)
POTASSIUM SERPL-SCNC: 3.8 MMOL/L (ref 3.5–5)
PROTEUS SPECIES BY PCR: NOT DETECTED
PSEUDOMONAS AERUGINOSA BY PCR: NOT DETECTED
RBC # BLD: 4.4 E12/L (ref 3.8–5.8)
SCHISTOCYTES: ABNORMAL
SEDIMENTATION RATE, ERYTHROCYTE: 55 MM/HR (ref 0–15)
SERRATIA MARCESCENS BY PCR: NOT DETECTED
SODIUM BLD-SCNC: 136 MMOL/L (ref 132–146)
SODIUM BLD-SCNC: 137 MMOL/L (ref 132–146)
SOURCE OF BLOOD CULTURE: ABNORMAL
STAPHYLOCOCCUS AUREUS BY PCR: NOT DETECTED
STAPHYLOCOCCUS SPECIES BY PCR: NOT DETECTED
STREPTOCOCCUS AGALACTIAE BY PCR: NOT DETECTED
STREPTOCOCCUS PNEUMONIAE BY PCR: NOT DETECTED
STREPTOCOCCUS PYOGENES  BY PCR: NOT DETECTED
STREPTOCOCCUS SPECIES BY PCR: NOT DETECTED
TOTAL PROTEIN: 5.1 G/DL (ref 6.4–8.3)
TOTAL PROTEIN: 5.8 G/DL (ref 6.4–8.3)
TOXIC GRANULATION: ABNORMAL
WBC # BLD: 4.1 E9/L (ref 4.5–11.5)

## 2021-05-24 PROCEDURE — 85025 COMPLETE CBC W/AUTO DIFF WBC: CPT

## 2021-05-24 PROCEDURE — 80053 COMPREHEN METABOLIC PANEL: CPT

## 2021-05-24 PROCEDURE — 99233 SBSQ HOSP IP/OBS HIGH 50: CPT | Performed by: INTERNAL MEDICINE

## 2021-05-24 PROCEDURE — 6370000000 HC RX 637 (ALT 250 FOR IP): Performed by: INTERNAL MEDICINE

## 2021-05-24 PROCEDURE — 1200000000 HC SEMI PRIVATE

## 2021-05-24 PROCEDURE — 2500000003 HC RX 250 WO HCPCS: Performed by: INTERNAL MEDICINE

## 2021-05-24 PROCEDURE — 2580000003 HC RX 258: Performed by: SPECIALIST

## 2021-05-24 PROCEDURE — 83690 ASSAY OF LIPASE: CPT

## 2021-05-24 PROCEDURE — 2500000003 HC RX 250 WO HCPCS: Performed by: SURGERY

## 2021-05-24 PROCEDURE — 85651 RBC SED RATE NONAUTOMATED: CPT

## 2021-05-24 PROCEDURE — 87040 BLOOD CULTURE FOR BACTERIA: CPT

## 2021-05-24 PROCEDURE — 74181 MRI ABDOMEN W/O CONTRAST: CPT

## 2021-05-24 PROCEDURE — 36415 COLL VENOUS BLD VENIPUNCTURE: CPT

## 2021-05-24 PROCEDURE — 86140 C-REACTIVE PROTEIN: CPT

## 2021-05-24 PROCEDURE — 6360000002 HC RX W HCPCS: Performed by: SPECIALIST

## 2021-05-24 PROCEDURE — 2580000003 HC RX 258: Performed by: INTERNAL MEDICINE

## 2021-05-24 PROCEDURE — 83605 ASSAY OF LACTIC ACID: CPT

## 2021-05-24 PROCEDURE — 6360000002 HC RX W HCPCS: Performed by: INTERNAL MEDICINE

## 2021-05-24 RX ORDER — LORAZEPAM 1 MG/1
1 TABLET ORAL SEE ADMIN INSTRUCTIONS
Status: DISCONTINUED | OUTPATIENT
Start: 2021-05-24 | End: 2021-05-27 | Stop reason: HOSPADM

## 2021-05-24 RX ORDER — DEXTROSE, SODIUM CHLORIDE, AND POTASSIUM CHLORIDE 5; .45; .15 G/100ML; G/100ML; G/100ML
INJECTION INTRAVENOUS CONTINUOUS
Status: DISPENSED | OUTPATIENT
Start: 2021-05-24 | End: 2021-05-25

## 2021-05-24 RX ORDER — LORAZEPAM 0.5 MG/1
0.5 TABLET ORAL SEE ADMIN INSTRUCTIONS
Status: DISCONTINUED | OUTPATIENT
Start: 2021-05-24 | End: 2021-05-27 | Stop reason: HOSPADM

## 2021-05-24 RX ORDER — LORAZEPAM 1 MG/1
1 TABLET ORAL ONCE
Status: DISCONTINUED | OUTPATIENT
Start: 2021-05-24 | End: 2021-05-24 | Stop reason: SDUPTHER

## 2021-05-24 RX ORDER — HEPARIN SODIUM 10000 [USP'U]/ML
5000 INJECTION, SOLUTION INTRAVENOUS; SUBCUTANEOUS EVERY 8 HOURS SCHEDULED
Status: DISCONTINUED | OUTPATIENT
Start: 2021-05-24 | End: 2021-05-27 | Stop reason: HOSPADM

## 2021-05-24 RX ORDER — DEXTROSE, SODIUM CHLORIDE, AND POTASSIUM CHLORIDE 5; .45; .15 G/100ML; G/100ML; G/100ML
INJECTION INTRAVENOUS
Status: DISPENSED
Start: 2021-05-24 | End: 2021-05-24

## 2021-05-24 RX ORDER — LORAZEPAM 2 MG/ML
0.5 INJECTION INTRAMUSCULAR ONCE
Status: DISCONTINUED | OUTPATIENT
Start: 2021-05-24 | End: 2021-05-24 | Stop reason: SDUPTHER

## 2021-05-24 RX ADMIN — ACETAMINOPHEN 650 MG: 325 TABLET ORAL at 19:28

## 2021-05-24 RX ADMIN — ACETAMINOPHEN 650 MG: 325 TABLET ORAL at 06:50

## 2021-05-24 RX ADMIN — POTASSIUM CHLORIDE, DEXTROSE MONOHYDRATE AND SODIUM CHLORIDE: 150; 5; 450 INJECTION, SOLUTION INTRAVENOUS at 01:31

## 2021-05-24 RX ADMIN — HEPARIN SODIUM 5000 UNITS: 10000 INJECTION INTRAVENOUS; SUBCUTANEOUS at 14:35

## 2021-05-24 RX ADMIN — POTASSIUM CHLORIDE, DEXTROSE MONOHYDRATE AND SODIUM CHLORIDE: 150; 5; 450 INJECTION, SOLUTION INTRAVENOUS at 17:15

## 2021-05-24 RX ADMIN — METOPROLOL TARTRATE 25 MG: 25 TABLET, FILM COATED ORAL at 22:23

## 2021-05-24 RX ADMIN — LORAZEPAM 1 MG: 1 TABLET ORAL at 20:33

## 2021-05-24 RX ADMIN — LEVOTHYROXINE SODIUM 200 MCG: 100 TABLET ORAL at 06:49

## 2021-05-24 RX ADMIN — WATER 2000 MG: 1 INJECTION INTRAMUSCULAR; INTRAVENOUS; SUBCUTANEOUS at 17:15

## 2021-05-24 RX ADMIN — TAMSULOSIN HYDROCHLORIDE 0.4 MG: 0.4 CAPSULE ORAL at 09:48

## 2021-05-24 RX ADMIN — METOPROLOL TARTRATE 25 MG: 25 TABLET, FILM COATED ORAL at 09:48

## 2021-05-24 RX ADMIN — Medication 10 ML: at 22:34

## 2021-05-24 RX ADMIN — HEPARIN SODIUM 5000 UNITS: 10000 INJECTION INTRAVENOUS; SUBCUTANEOUS at 22:23

## 2021-05-24 ASSESSMENT — PAIN DESCRIPTION - DESCRIPTORS: DESCRIPTORS: HEADACHE

## 2021-05-24 ASSESSMENT — PAIN DESCRIPTION - PAIN TYPE: TYPE: ACUTE PAIN

## 2021-05-24 ASSESSMENT — PAIN DESCRIPTION - LOCATION: LOCATION: HEAD

## 2021-05-24 ASSESSMENT — PAIN SCALES - GENERAL
PAINLEVEL_OUTOF10: 1
PAINLEVEL_OUTOF10: 0
PAINLEVEL_OUTOF10: 4
PAINLEVEL_OUTOF10: 3

## 2021-05-24 ASSESSMENT — PAIN DESCRIPTION - ORIENTATION: ORIENTATION: ANTERIOR

## 2021-05-24 NOTE — CARE COORDINATION
Met with patient about diagnosis and discharge plan of care. Pt lives with spouse in [place] with spouse and daughter in [address] [place] [address], independent prior to admit. Pt here visiting family. Admit for cholecystitis. NPO, iv fluids. No needs for home.  Will follow-[facility]

## 2021-05-24 NOTE — PROGRESS NOTES
GENERAL SURGERY  DAILY PROGRESS NOTE  5/24/2021    Cc: abd pain    Subjective:  Less pain today as has not been eating. Denies nausea, vomiting      Objective:  /63   Pulse 77   Temp 97.2 °F (36.2 °C) (Tympanic)   Resp 16   Ht 5' 10\" (1.778 m)   Wt 236 lb (107 kg)   SpO2 95%   BMI 33.86 kg/m²     GENERAL:  Laying in bed, awake, alert, cooperative, no apparent distress  HEAD: Normocephalic, atraumatic  EYES: No sclera icterus, pupils equal  LUNGS:  No increased work of breathing  CARDIOVASCULAR:  Regular rate  ABDOMEN:  Soft, non-tender, non-distended  EXTREMITIES: No edema or swelling  SKIN: Warm and dry, no jaundice    Assessment/Plan:  79 y.o. male w/ gallstone pancreatitis, hyperbili; possible acute breonna    RUQ US with cholelithiasis  Am labs pending - monitor bilirubin  May need MRCP    Will be d/w Dr. Mary Kay Ogden    Electronically signed by Dell Mojica DO on 5/24/2021 at 5:03 AM    Attending Note:    Patient seen/examined 5/24/2021. Agree with above assessment and plan. Abdominal exam benign. Trend lipase. MRCP pending. Patient from out of town, would like to try to have cholecystectomy at home in [place] if possible.

## 2021-05-24 NOTE — PROGRESS NOTES
and atraumatic  Eyes: pupils equal, round, and reactive to light, extraocular eye movements intact, conjunctivae normal  ENT: tympanic membrane, external ear and ear canal normal bilaterally, oropharynx clear and moist with normal mucous membranes  Neck: neck supple and non tender without mass, no thyromegaly or thyroid nodules, no cervical lymphadenopathy   Pulmonary/Chest: clear to auscultation bilaterally- no wheezes, rales or rhonchi, normal air movement, no respiratory distress  Cardiovascular: normal rate, normal S1 and S2, no gallops, intact distal pulses and no carotid bruits  Abdomen: soft, non-tender, non-distended, normal bowel sounds, no masses or organomegaly      Recent Labs     05/21/21 1835 05/23/21  0427 05/24/21  0445    134 137   K 3.7 3.8 4.0    103 106   CO2 22 23 23   BUN 28* 25* 18   CREATININE 1.0 0.9 0.7   GLUCOSE 253* 215* 182*   CALCIUM 8.7 9.0 8.3*       Recent Labs     05/21/21 1835 05/23/21  0427   WBC 5.8 8.5   RBC 4.38 4.66   HGB 10.3* 10.9*   HCT 34.3* 35.8*   MCV 78.3* 76.8*   MCH 23.5* 23.4*   MCHC 30.0* 30.4*   RDW 17.5* 18.0*   * 101*   MPV 9.3 9.9       Gram stain from blood culture with gram-negative rods. Radiology:   US GALLBLADDER RUQ   Final Result   Cholelithiasis. No sonographic evidence of acute cholecystitis. CT ABDOMEN PELVIS W IV CONTRAST Additional Contrast? None   Final Result   Cholelithiasis. Pericholecystic fluid, concerning for acute cholecystitis. Surgical consultation recommended. Stones within the urinary bladder. Nonobstructing bilateral renal stones. Other chronic appearing findings. CT HEAD WO CONTRAST   Final Result   No acute intracranial abnormality. MRI would be useful if symptoms persist.         XR CHEST PORTABLE   Final Result   Diminished lung volume without acute process otherwise identified.   PA and   lateral views would be useful for further assessment, if symptoms persist.         MRI

## 2021-05-24 NOTE — CONSULTS
ramipril  1.25 mg Oral Daily    tamsulosin  0.4 mg Oral Daily    sodium chloride flush  5-40 mL Intravenous 2 times per day     Continuous Infusions:   sodium chloride       PRN Meds:sodium chloride flush, sodium chloride, promethazine **OR** ondansetron, polyethylene glycol, acetaminophen **OR** acetaminophen    Allergies:  Patient has no known allergies. Social History:   Social History     Socioeconomic History    Marital status:      Spouse name: None    Number of children: None    Years of education: None    Highest education level: None   Occupational History    None   Tobacco Use    Smoking status: Former Smoker     Quit date:      Years since quittin.4    Smokeless tobacco: Never Used   Vaping Use    Vaping Use: Never used   Substance and Sexual Activity    Alcohol use: Yes     Comment: once a month    Drug use: Never    Sexual activity: None   Other Topics Concern    None   Social History Narrative    None     Social Determinants of Health     Financial Resource Strain:     Difficulty of Paying Living Expenses:    Food Insecurity:     Worried About Running Out of Food in the Last Year:     Ran Out of Food in the Last Year:    Transportation Needs:     Lack of Transportation (Medical):  Lack of Transportation (Non-Medical):    Physical Activity:     Days of Exercise per Week:     Minutes of Exercise per Session:    Stress:     Feeling of Stress :    Social Connections:     Frequency of Communication with Friends and Family:     Frequency of Social Gatherings with Friends and Family:     Attends Temple Services:     Active Member of Clubs or Organizations:     Attends Club or Organization Meetings:     Marital Status:    Intimate Partner Violence:     Fear of Current or Ex-Partner:     Emotionally Abused:     Physically Abused:     Sexually Abused:       Pets: Dog  Travel: None  The patient lives at home with his wife and daughter.   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 No pertinent family history. . Otherwise non-pertinent to the chief complaint. REVIEW OF SYSTEMS:    Constitutional: Negative for fevers, chills, diaphoresis  Neurologic: Negative   Psychiatric: Negative  Rheumatologic: Negative   Endocrine: Negative  Hematologic: Negative  Immunologic: Negative. Immunizations, including SARS-CoV-2 up to date  ENT: Negative  Respiratory: Negative   Cardiovascular: Negative  GI: The patient had an acute onset of sharp abdominal pain before he came to the hospital.  He said he would have abdominal discomfort after eating meals and this apparently had been going on for a long time. He also reports a long history of flatulence  : Negative  Musculoskeletal: Negative  Skin: No rashes. PHYSICAL EXAM:    Vitals:   /61   Pulse 64   Temp 97 °F (36.1 °C) (Infrared)   Resp 16   Ht 5' 10\" (1.778 m)   Wt 236 lb (107 kg)   SpO2 96%   BMI 33.86 kg/m²   Constitutional: The patient is awake, alert, and oriented. Skin: Warm and dry. No rashes were noted. HEENT: Eyes show round, and reactive pupils. No jaundice. Moist mucous membranes, no ulcerations, no thrush. Neck: Supple to movements. No lymphadenopathy. Chest: No use of accessory muscles to breathe. Symmetrical expansion. Auscultation reveals no wheezing, crackles, or rhonchi. Cardiovascular: S1 and S2 are rhythmic and regular. No murmurs appreciated. Abdomen: Positive bowel sounds to auscultation. Benign to palpation. No masses felt. No hepatosplenomegaly. Extremities: No clubbing, no cyanosis, no edema.   Lines: peripheral      CBC+dif:  Recent Labs     05/21/21  1835 05/23/21  0427 05/24/21  1330   WBC 5.8 8.5 4.1*   HGB 10.3* 10.9* 10.2*   HCT 34.3* 35.8* 34.2*   MCV 78.3* 76.8* 77.7*   * 101* 101*   NEUTROABS 5.45 8.08*  --      No results found for: CRP   No results found for: [facility]  No results found for: SEDRATE  Lab Results   Component Value Date     (H) 05/24/2021     (H) 05/24/2021    ALKPHOS 227 (H) 05/24/2021    BILITOT 0.7 05/24/2021     Lab Results   Component Value Date     05/24/2021    K 3.8 05/24/2021    K 4.0 05/24/2021     05/24/2021    CO2 25 05/24/2021    BUN 15 05/24/2021    CREATININE 0.8 05/24/2021    GFRAA >60 05/24/2021    LABGLOM >60 05/24/2021    GLUCOSE 157 05/24/2021    PROT 5.8 05/24/2021    LABALBU 3.0 05/24/2021    CALCIUM 8.6 05/24/2021    BILITOT 0.7 05/24/2021    ALKPHOS 227 05/24/2021     05/24/2021     05/24/2021       No results found for: PROTIME, INR    Lab Results   Component Value Date    TSH 2.000 05/23/2021       Lab Results   Component Value Date    COLORU Yellow 05/23/2021    PHUR 6.0 05/23/2021    WBCUA 1-3 05/23/2021    RBCUA 10-20 05/23/2021    BACTERIA FEW 05/23/2021    CLARITYU Clear 05/23/2021    SPECGRAV 1.025 05/23/2021    LEUKOCYTESUR Negative 05/23/2021    UROBILINOGEN >=8.0 05/23/2021    BILIRUBINUR SMALL 05/23/2021    BLOODU LARGE 05/23/2021    GLUCOSEU 250 05/23/2021       No results found for: HCO3, BE, O2SAT, PH, THGB, PCO2, PO2, TCO2  Radiology:  Noted    Microbiology:  Pending  Recent Labs     05/23/21  0427   BC Gram stain performed from blood culture bottle media  Gram negative rods  *     No results for input(s): ORG in the last 72 hours. Recent Labs     05/23/21 0441   BLOODCULT2 Gram stain performed from blood culture bottle media  Gram negative rods  *     No results for input(s): STREPNEUMAGU in the last 72 hours. No results for input(s): LP1UAG in the last 72 hours. No results for input(s): ASO in the last 72 hours. No results for input(s): CULTRESP in the last 72 hours. No results for input(s): PROCAL in the last 72 hours.     Assessment:  · Probable gallstone pancreatitis  · Possible cholecystitis versus cholangitis  · E. coli bacteremia associated to the above  · Elevation of transaminases and pancreatic enzymes associated to the above  · Leukopenia secondary to sepsis    Plan:    · Repeat blood cultures x1  · Check cultures, baseline ESR, CRP  · Start Ceftriaxone  · Surgery following. MRCP has been ordered  · Will follow with you    Thank you for having us see this patient in consultation. I will be discussing this case with the treating physicians.     Wandy Doyle MD  2:38 PM  5/24/2021

## 2021-05-24 NOTE — ANESTHESIA PRE PROCEDURE
Department of Anesthesiology  Preprocedure Note       Name:  Zayda Ponce   Age:  79 y.o.  :  1950                                          MRN:  [id number]         Date:  2021      Surgeon: Guy Mcfadden):  Pati Patiño MD    Procedure: Procedure(s):  LAPAROSCOPIC ROBOTIC ASSISTED CHOLECYSTECTOMY POSSIBLE OPEN POSSIBLE GRAM    Medications prior to admission:   Prior to Admission medications    Medication Sig Start Date End Date Taking?  Authorizing Provider   aspirin 81 MG EC tablet Take 81 mg by mouth daily    Historical Provider, MD   atorvastatin (LIPITOR) 20 MG tablet TAKE 1 TABLET (20 MG TOTAL) BY MOUTH ONCE A DAY 21   Historical Provider, MD   ACCU-CHEK GUIDE strip TEST AS DIRECTED 3 TIMES A DAY 3/21/21   Historical Provider, MD   Accu-Chek FastClix Lancets MISC TEST 3 TIMES A DAY(NEED MED B CARD) 3/21/21   Historical Provider, MD   levothyroxine (SYNTHROID) 200 MCG tablet TAKE 1 TABLET BY MOUTH IN THE MORNING ON AN EMPTY STOMACH ONCE A DAY 3/21/21   Historical Provider, MD   metFORMIN (GLUCOPHAGE-XR) 500 MG extended release tablet TAKE 4 TABLETS BY MOUTH EVERY DAY 21   Historical Provider, MD   metoprolol tartrate (LOPRESSOR) 25 MG tablet TAKE 1 TABLET BY MOUTH TWICE A DAY 3/11/21   Historical Provider, MD   nitroGLYCERIN (NITROSTAT) 0.4 MG SL tablet Place 0.4 mg under the tongue    Historical Provider, MD   tamsulosin (FLOMAX) 0.4 MG capsule TAKE 1 CAPSULE BY MOUTH EVERY DAY 21   Historical Provider, MD   ramipril (ALTACE) 1.25 MG capsule TAKE 1 CAPSULE (1.25 MG TOTAL) BY MOUTH ONCE A DAY 21   Historical Provider, MD       Current medications:    Current Facility-Administered Medications   Medication Dose Route Frequency Provider Last Rate Last Admin    LORazepam (ATIVAN) tablet 1 mg  1 mg Oral See Admin Instructions Manuela Patino MD        Or    LORazepam (ATIVAN) tablet 0.5 mg  0.5 mg Oral See Admin Instructions Meir Ellison MD        dextrose 5% and 0.45% NaCl with KCl 20 mEq 20-5-0.45 MEQ/L-%-% infusion             heparin (porcine) injection 5,000 Units  5,000 Units Subcutaneous 3 times per day Echo Jurado MD   5,000 Units at 05/24/21 1435    cefTRIAXone (ROCEPHIN) 2,000 mg in sterile water 20 mL IV syringe  2,000 mg Intravenous Q24H Echo Chappell MD        levothyroxine (SYNTHROID) tablet 200 mcg  200 mcg Oral Daily Meir Ness MD   200 mcg at 05/24/21 0649    metoprolol tartrate (LOPRESSOR) tablet 25 mg  25 mg Oral BID Meir Ness MD   25 mg at 05/24/21 0948    [Held by provider] ramipril (ALTACE) capsule 1.25 mg  1.25 mg Oral Daily Meir Ness MD   1.25 mg at 05/23/21 1158    tamsulosin (FLOMAX) capsule 0.4 mg  0.4 mg Oral Daily Meir Ness MD   0.4 mg at 05/24/21 0948    sodium chloride flush 0.9 % injection 5-40 mL  5-40 mL Intravenous 2 times per day Echo Jurado MD   10 mL at 05/23/21 1152    sodium chloride flush 0.9 % injection 5-40 mL  5-40 mL Intravenous PRN Echo Jurado MD        0.9 % sodium chloride infusion  25 mL Intravenous PRN Meir Ness MD        promethazine (PHENERGAN) tablet 12.5 mg  12.5 mg Oral Q6H PRN Meir Ness MD        Or    ondansetron (ZOFRAN) injection 4 mg  4 mg Intravenous Q6H PRN Meir Ness MD        polyethylene glycol (GLYCOLAX) packet 17 g  17 g Oral Daily PRN Meir Ness MD        acetaminophen (TYLENOL) tablet 650 mg  650 mg Oral Q6H PRN Meir Ness MD   650 mg at 05/24/21 0875    Or    acetaminophen (TYLENOL) suppository 650 mg  650 mg Rectal Q6H PRN Meir Ness MD           Allergies:  No Known Allergies    Problem List:    Patient Active Problem List   Diagnosis Code    Cholecystitis K81.9       Past Medical History:        Diagnosis Date    Diabetes mellitus ([facility] [address]     Thyroid disease        Past Surgical History:        Procedure Laterality Date    GASTRIC BYPASS SURGERY  1997    TOTAL KNEE ARTHROPLASTY Left 2016       Social History:    Social History     Tobacco Use    Smoking status: Former Smoker     Quit date:      Years since quittin.4    Smokeless tobacco: Never Used   Substance Use Topics    Alcohol use: Yes     Comment: once a month                                Counseling given: Not Answered      Vital Signs (Current):   Vitals:    21 0000 21 0430 21 0843 21 1215   BP: 139/76 127/63 (!) 103/54 115/61   Pulse: 80 77 63 64   Resp: 16 16 16 16   Temp: 36.8 °C (98.3 °F) 36.2 °C (97.2 °F) 36.3 °C (97.3 °F) 36.1 °C (97 °F)   TempSrc: Oral Tympanic Infrared Infrared   SpO2: 97% 95% 96%    Weight:       Height:                                                  BP Readings from Last 3 Encounters:   21 115/61   21 119/63       NPO Status:                          Time of last solid consumption:                         Date of last liquid consumption: 21                        Date of last solid food consumption: 21    BMI:   Wt Readings from Last 3 Encounters:   21 236 lb (107 kg)   21 236 lb (107 kg)     Body mass index is 33.86 kg/m².     CBC:   Lab Results   Component Value Date    WBC 4.1 2021    RBC 4.40 2021    HGB 10.2 2021    HCT 34.2 2021    MCV 77.7 2021    RDW 18.1 2021     2021       CMP:   Lab Results   Component Value Date     2021    K 3.8 2021    K 4.0 2021     2021    CO2 25 2021    BUN 15 2021    CREATININE 0.8 2021    GFRAA >60 2021    LABGLOM >60 2021    GLUCOSE 157 2021    PROT 5.8 2021    CALCIUM 8.6 2021    BILITOT 0.7 2021    ALKPHOS 227 2021     2021     2021       POC Tests: No results for input(s): POCGLU, POCNA, POCK, POCCL, POCBUN, Michelle Davila in the last 72 hours. Coags: No results found for: PROTIME, INR, APTT    HCG (If Applicable): No results found for: PREGTESTUR, PREGSERUM, HCG, HCGQUANT     ABGs: No results found for: PHART, PO2ART, XFZ1ZHF, NDF8OHY, BEART, O6XGOKII     Type & Screen (If Applicable):  No results found for: LABABO, LABRH    Drug/Infectious Status (If Applicable):  No results found for: HIV, HEPCAB    COVID-19 Screening (If Applicable):   Lab Results   Component Value Date    COVID19 Not Detected 05/23/2021           Anesthesia Evaluation  Patient summary reviewed and Nursing notes reviewed no history of anesthetic complications:   Airway: Mallampati: II  TM distance: >3 FB   Neck ROM: full  Mouth opening: > = 3 FB Dental:      Comment: Permanent bridges Right upper teeth    Pulmonary: breath sounds clear to auscultation      (-) not a current smoker                          ROS comment: History of sleep apnea (prior to gastric bypass); No sleep study since then   Cardiovascular:    (+) CABG/stent ( Bypass in 2019):,       ECG reviewed  Rhythm: regular  Rate: normal                    Neuro/Psych:   Negative Neuro/Psych ROS              GI/Hepatic/Renal:            ROS comment: History of gastric bypass (over 15-20 yrs ago). Endo/Other:    (+) DiabetesType II DM, , hypothyroidism: arthritis: OA., .                 Abdominal:           Vascular:   + DVT ( hx of DVT about 25 years ago before gastric bypass surgery), . Anesthesia Plan      general     ASA 3       Induction: intravenous. BIS  MIPS: Postoperative opioids intended and Prophylactic antiemetics administered. Anesthetic plan and risks discussed with patient. Use of blood products discussed with patient whom consented to blood products. Plan discussed with CRNA and fellow. Marco Antonio Serrano RN   5/24/2021        Changes made to above assessment and physical exam.  Spoke to patient about anesthetic plan. Patient understands and wishes to proceed.  (this addendum was done preop but unable to be filed electronically at that time)

## 2021-05-25 ENCOUNTER — ANESTHESIA (OUTPATIENT)
Dept: OPERATING ROOM | Age: 71
DRG: 853 | End: 2021-05-25
Payer: MEDICARE

## 2021-05-25 VITALS — TEMPERATURE: 97.3 F | SYSTOLIC BLOOD PRESSURE: 133 MMHG | OXYGEN SATURATION: 98 % | DIASTOLIC BLOOD PRESSURE: 63 MMHG

## 2021-05-25 LAB
ALBUMIN SERPL-MCNC: 2.9 G/DL (ref 3.5–5.2)
ALP BLD-CCNC: 211 U/L (ref 40–129)
ALT SERPL-CCNC: 165 U/L (ref 0–40)
ANION GAP SERPL CALCULATED.3IONS-SCNC: 8 MMOL/L (ref 7–16)
AST SERPL-CCNC: 89 U/L (ref 0–39)
BASOPHILS ABSOLUTE: 0.02 E9/L (ref 0–0.2)
BASOPHILS RELATIVE PERCENT: 0.6 % (ref 0–2)
BILIRUB SERPL-MCNC: 0.4 MG/DL (ref 0–1.2)
BILIRUBIN DIRECT: <0.2 MG/DL (ref 0–0.3)
BILIRUBIN, INDIRECT: ABNORMAL MG/DL (ref 0–1)
BUN BLDV-MCNC: 13 MG/DL (ref 6–23)
CALCIUM SERPL-MCNC: 8.2 MG/DL (ref 8.6–10.2)
CHLORIDE BLD-SCNC: 105 MMOL/L (ref 98–107)
CO2: 24 MMOL/L (ref 22–29)
CREAT SERPL-MCNC: 0.8 MG/DL (ref 0.7–1.2)
EOSINOPHILS ABSOLUTE: 0.17 E9/L (ref 0.05–0.5)
EOSINOPHILS RELATIVE PERCENT: 5 % (ref 0–6)
GFR AFRICAN AMERICAN: >60
GFR NON-AFRICAN AMERICAN: >60 ML/MIN/1.73
GLUCOSE BLD-MCNC: 207 MG/DL (ref 74–99)
HCT VFR BLD CALC: 31.4 % (ref 37–54)
HEMOGLOBIN: 9.5 G/DL (ref 12.5–16.5)
IMMATURE GRANULOCYTES #: 0.03 E9/L
IMMATURE GRANULOCYTES %: 0.9 % (ref 0–5)
LIPASE: 196 U/L (ref 13–60)
LYMPHOCYTES ABSOLUTE: 0.6 E9/L (ref 1.5–4)
LYMPHOCYTES RELATIVE PERCENT: 17.5 % (ref 20–42)
MCH RBC QN AUTO: 23.3 PG (ref 26–35)
MCHC RBC AUTO-ENTMCNC: 30.3 % (ref 32–34.5)
MCV RBC AUTO: 77.1 FL (ref 80–99.9)
MONOCYTES ABSOLUTE: 0.31 E9/L (ref 0.1–0.95)
MONOCYTES RELATIVE PERCENT: 9.1 % (ref 2–12)
NEUTROPHILS ABSOLUTE: 2.29 E9/L (ref 1.8–7.3)
NEUTROPHILS RELATIVE PERCENT: 66.9 % (ref 43–80)
PDW BLD-RTO: 17.9 FL (ref 11.5–15)
PLATELET # BLD: 95 E9/L (ref 130–450)
PLATELET CONFIRMATION: NORMAL
PMV BLD AUTO: 10 FL (ref 7–12)
POTASSIUM SERPL-SCNC: 3.8 MMOL/L (ref 3.5–5)
RBC # BLD: 4.07 E12/L (ref 3.8–5.8)
SODIUM BLD-SCNC: 137 MMOL/L (ref 132–146)
TOTAL PROTEIN: 4.8 G/DL (ref 6.4–8.3)
URINE CULTURE, ROUTINE: NORMAL
WBC # BLD: 3.4 E9/L (ref 4.5–11.5)

## 2021-05-25 PROCEDURE — 3700000000 HC ANESTHESIA ATTENDED CARE: Performed by: SURGERY

## 2021-05-25 PROCEDURE — 87081 CULTURE SCREEN ONLY: CPT

## 2021-05-25 PROCEDURE — 36415 COLL VENOUS BLD VENIPUNCTURE: CPT

## 2021-05-25 PROCEDURE — 6360000002 HC RX W HCPCS: Performed by: STUDENT IN AN ORGANIZED HEALTH CARE EDUCATION/TRAINING PROGRAM

## 2021-05-25 PROCEDURE — 6370000000 HC RX 637 (ALT 250 FOR IP): Performed by: STUDENT IN AN ORGANIZED HEALTH CARE EDUCATION/TRAINING PROGRAM

## 2021-05-25 PROCEDURE — 7100000000 HC PACU RECOVERY - FIRST 15 MIN: Performed by: SURGERY

## 2021-05-25 PROCEDURE — 6360000002 HC RX W HCPCS: Performed by: NURSE ANESTHETIST, CERTIFIED REGISTERED

## 2021-05-25 PROCEDURE — 2709999900 HC NON-CHARGEABLE SUPPLY: Performed by: SURGERY

## 2021-05-25 PROCEDURE — 80048 BASIC METABOLIC PNL TOTAL CA: CPT

## 2021-05-25 PROCEDURE — 85025 COMPLETE CBC W/AUTO DIFF WBC: CPT

## 2021-05-25 PROCEDURE — 8E0W4CZ ROBOTIC ASSISTED PROCEDURE OF TRUNK REGION, PERCUTANEOUS ENDOSCOPIC APPROACH: ICD-10-PCS | Performed by: SURGERY

## 2021-05-25 PROCEDURE — 6360000002 HC RX W HCPCS: Performed by: ANESTHESIOLOGY

## 2021-05-25 PROCEDURE — 88304 TISSUE EXAM BY PATHOLOGIST: CPT

## 2021-05-25 PROCEDURE — 2500000003 HC RX 250 WO HCPCS: Performed by: SURGERY

## 2021-05-25 PROCEDURE — 2580000003 HC RX 258: Performed by: NURSE ANESTHETIST, CERTIFIED REGISTERED

## 2021-05-25 PROCEDURE — 6370000000 HC RX 637 (ALT 250 FOR IP): Performed by: INTERNAL MEDICINE

## 2021-05-25 PROCEDURE — 3600000019 HC SURGERY ROBOT ADDTL 15MIN: Performed by: SURGERY

## 2021-05-25 PROCEDURE — 3600000009 HC SURGERY ROBOT BASE: Performed by: SURGERY

## 2021-05-25 PROCEDURE — 3700000001 HC ADD 15 MINUTES (ANESTHESIA): Performed by: SURGERY

## 2021-05-25 PROCEDURE — 83690 ASSAY OF LIPASE: CPT

## 2021-05-25 PROCEDURE — 2500000003 HC RX 250 WO HCPCS: Performed by: NURSE ANESTHETIST, CERTIFIED REGISTERED

## 2021-05-25 PROCEDURE — S2900 ROBOTIC SURGICAL SYSTEM: HCPCS | Performed by: SURGERY

## 2021-05-25 PROCEDURE — 0FT44ZZ RESECTION OF GALLBLADDER, PERCUTANEOUS ENDOSCOPIC APPROACH: ICD-10-PCS | Performed by: SURGERY

## 2021-05-25 PROCEDURE — 99233 SBSQ HOSP IP/OBS HIGH 50: CPT | Performed by: INTERNAL MEDICINE

## 2021-05-25 PROCEDURE — 7100000001 HC PACU RECOVERY - ADDTL 15 MIN: Performed by: SURGERY

## 2021-05-25 PROCEDURE — 80076 HEPATIC FUNCTION PANEL: CPT

## 2021-05-25 PROCEDURE — 6360000002 HC RX W HCPCS: Performed by: SPECIALIST

## 2021-05-25 PROCEDURE — 2580000003 HC RX 258: Performed by: STUDENT IN AN ORGANIZED HEALTH CARE EDUCATION/TRAINING PROGRAM

## 2021-05-25 PROCEDURE — 1200000000 HC SEMI PRIVATE

## 2021-05-25 PROCEDURE — 2580000003 HC RX 258: Performed by: SPECIALIST

## 2021-05-25 RX ORDER — DEXAMETHASONE SODIUM PHOSPHATE 4 MG/ML
INJECTION, SOLUTION INTRA-ARTICULAR; INTRALESIONAL; INTRAMUSCULAR; INTRAVENOUS; SOFT TISSUE PRN
Status: DISCONTINUED | OUTPATIENT
Start: 2021-05-25 | End: 2021-05-25 | Stop reason: SDUPTHER

## 2021-05-25 RX ORDER — FENTANYL CITRATE 50 UG/ML
INJECTION, SOLUTION INTRAMUSCULAR; INTRAVENOUS PRN
Status: DISCONTINUED | OUTPATIENT
Start: 2021-05-25 | End: 2021-05-25 | Stop reason: SDUPTHER

## 2021-05-25 RX ORDER — SODIUM CHLORIDE 9 MG/ML
INJECTION, SOLUTION INTRAVENOUS CONTINUOUS PRN
Status: DISCONTINUED | OUTPATIENT
Start: 2021-05-25 | End: 2021-05-25 | Stop reason: SDUPTHER

## 2021-05-25 RX ORDER — NEOSTIGMINE METHYLSULFATE 1 MG/ML
INJECTION, SOLUTION INTRAVENOUS PRN
Status: DISCONTINUED | OUTPATIENT
Start: 2021-05-25 | End: 2021-05-25 | Stop reason: SDUPTHER

## 2021-05-25 RX ORDER — PROMETHAZINE HYDROCHLORIDE 25 MG/ML
6.25 INJECTION, SOLUTION INTRAMUSCULAR; INTRAVENOUS
Status: DISCONTINUED | OUTPATIENT
Start: 2021-05-25 | End: 2021-05-25 | Stop reason: HOSPADM

## 2021-05-25 RX ORDER — LIDOCAINE HYDROCHLORIDE AND EPINEPHRINE 10; 10 MG/ML; UG/ML
INJECTION, SOLUTION INFILTRATION; PERINEURAL PRN
Status: DISCONTINUED | OUTPATIENT
Start: 2021-05-25 | End: 2021-05-25 | Stop reason: HOSPADM

## 2021-05-25 RX ORDER — GLYCOPYRROLATE 1 MG/5 ML
SYRINGE (ML) INTRAVENOUS PRN
Status: DISCONTINUED | OUTPATIENT
Start: 2021-05-25 | End: 2021-05-25 | Stop reason: SDUPTHER

## 2021-05-25 RX ORDER — DIPHENHYDRAMINE HYDROCHLORIDE 50 MG/ML
12.5 INJECTION INTRAMUSCULAR; INTRAVENOUS
Status: DISCONTINUED | OUTPATIENT
Start: 2021-05-25 | End: 2021-05-25 | Stop reason: HOSPADM

## 2021-05-25 RX ORDER — ONDANSETRON 2 MG/ML
INJECTION INTRAMUSCULAR; INTRAVENOUS PRN
Status: DISCONTINUED | OUTPATIENT
Start: 2021-05-25 | End: 2021-05-25 | Stop reason: SDUPTHER

## 2021-05-25 RX ORDER — FENTANYL CITRATE 50 UG/ML
25 INJECTION, SOLUTION INTRAMUSCULAR; INTRAVENOUS EVERY 5 MIN PRN
Status: DISCONTINUED | OUTPATIENT
Start: 2021-05-25 | End: 2021-05-25 | Stop reason: HOSPADM

## 2021-05-25 RX ORDER — PROPOFOL 10 MG/ML
INJECTION, EMULSION INTRAVENOUS PRN
Status: DISCONTINUED | OUTPATIENT
Start: 2021-05-25 | End: 2021-05-25 | Stop reason: SDUPTHER

## 2021-05-25 RX ORDER — FENTANYL CITRATE 50 UG/ML
50 INJECTION, SOLUTION INTRAMUSCULAR; INTRAVENOUS EVERY 5 MIN PRN
Status: DISCONTINUED | OUTPATIENT
Start: 2021-05-25 | End: 2021-05-25 | Stop reason: HOSPADM

## 2021-05-25 RX ORDER — INDOCYANINE GREEN AND WATER 25 MG
5 KIT INJECTION
Status: COMPLETED | OUTPATIENT
Start: 2021-05-25 | End: 2021-05-25

## 2021-05-25 RX ORDER — OXYCODONE HYDROCHLORIDE AND ACETAMINOPHEN 5; 325 MG/1; MG/1
1 TABLET ORAL
Status: DISCONTINUED | OUTPATIENT
Start: 2021-05-25 | End: 2021-05-25 | Stop reason: HOSPADM

## 2021-05-25 RX ORDER — MORPHINE SULFATE 2 MG/ML
2 INJECTION, SOLUTION INTRAMUSCULAR; INTRAVENOUS
Status: DISCONTINUED | OUTPATIENT
Start: 2021-05-25 | End: 2021-05-27 | Stop reason: HOSPADM

## 2021-05-25 RX ORDER — ROCURONIUM BROMIDE 10 MG/ML
INJECTION, SOLUTION INTRAVENOUS PRN
Status: DISCONTINUED | OUTPATIENT
Start: 2021-05-25 | End: 2021-05-25 | Stop reason: SDUPTHER

## 2021-05-25 RX ORDER — OXYCODONE HYDROCHLORIDE 5 MG/1
5 TABLET ORAL EVERY 4 HOURS PRN
Status: DISCONTINUED | OUTPATIENT
Start: 2021-05-25 | End: 2021-05-27 | Stop reason: HOSPADM

## 2021-05-25 RX ORDER — OXYCODONE HYDROCHLORIDE 5 MG/1
10 TABLET ORAL EVERY 4 HOURS PRN
Status: DISCONTINUED | OUTPATIENT
Start: 2021-05-25 | End: 2021-05-27 | Stop reason: HOSPADM

## 2021-05-25 RX ORDER — LIDOCAINE HYDROCHLORIDE 20 MG/ML
INJECTION, SOLUTION EPIDURAL; INFILTRATION; INTRACAUDAL; PERINEURAL PRN
Status: DISCONTINUED | OUTPATIENT
Start: 2021-05-25 | End: 2021-05-25 | Stop reason: SDUPTHER

## 2021-05-25 RX ORDER — MEPERIDINE HYDROCHLORIDE 25 MG/ML
12.5 INJECTION INTRAMUSCULAR; INTRAVENOUS; SUBCUTANEOUS EVERY 5 MIN PRN
Status: DISCONTINUED | OUTPATIENT
Start: 2021-05-25 | End: 2021-05-25 | Stop reason: HOSPADM

## 2021-05-25 RX ADMIN — METOPROLOL TARTRATE 25 MG: 25 TABLET, FILM COATED ORAL at 23:32

## 2021-05-25 RX ADMIN — HEPARIN SODIUM 5000 UNITS: 10000 INJECTION INTRAVENOUS; SUBCUTANEOUS at 23:23

## 2021-05-25 RX ADMIN — METOPROLOL TARTRATE 25 MG: 25 TABLET, FILM COATED ORAL at 09:52

## 2021-05-25 RX ADMIN — WATER 2000 MG: 1 INJECTION INTRAMUSCULAR; INTRAVENOUS; SUBCUTANEOUS at 19:12

## 2021-05-25 RX ADMIN — FENTANYL CITRATE 25 MCG: 50 INJECTION, SOLUTION INTRAMUSCULAR; INTRAVENOUS at 18:35

## 2021-05-25 RX ADMIN — TAMSULOSIN HYDROCHLORIDE 0.4 MG: 0.4 CAPSULE ORAL at 09:51

## 2021-05-25 RX ADMIN — Medication 10 ML: at 23:32

## 2021-05-25 RX ADMIN — PROMETHAZINE HYDROCHLORIDE 6.25 MG: 25 INJECTION INTRAMUSCULAR; INTRAVENOUS at 18:38

## 2021-05-25 RX ADMIN — SODIUM CHLORIDE: 9 INJECTION, SOLUTION INTRAVENOUS at 16:32

## 2021-05-25 RX ADMIN — Medication 0.4 MG: at 17:37

## 2021-05-25 RX ADMIN — DEXAMETHASONE SODIUM PHOSPHATE 10 MG: 4 INJECTION, SOLUTION INTRAMUSCULAR; INTRAVENOUS at 16:42

## 2021-05-25 RX ADMIN — FENTANYL CITRATE 25 MCG: 50 INJECTION, SOLUTION INTRAMUSCULAR; INTRAVENOUS at 18:12

## 2021-05-25 RX ADMIN — LIDOCAINE HYDROCHLORIDE 5 ML: 20 INJECTION, SOLUTION EPIDURAL; INFILTRATION; INTRACAUDAL; PERINEURAL at 16:38

## 2021-05-25 RX ADMIN — PROPOFOL 160 MG: 10 INJECTION, EMULSION INTRAVENOUS at 16:38

## 2021-05-25 RX ADMIN — INDOCYANINE GREEN AND WATER 5 MG: KIT at 15:53

## 2021-05-25 RX ADMIN — MORPHINE SULFATE 2 MG: 2 INJECTION, SOLUTION INTRAMUSCULAR; INTRAVENOUS at 20:52

## 2021-05-25 RX ADMIN — ONDANSETRON 4 MG: 2 INJECTION INTRAMUSCULAR; INTRAVENOUS at 16:42

## 2021-05-25 RX ADMIN — ROCURONIUM BROMIDE 10 MG: 10 INJECTION, SOLUTION INTRAVENOUS at 16:57

## 2021-05-25 RX ADMIN — Medication 3 MG: at 17:37

## 2021-05-25 RX ADMIN — ROCURONIUM BROMIDE 30 MG: 10 INJECTION, SOLUTION INTRAVENOUS at 16:38

## 2021-05-25 RX ADMIN — FENTANYL CITRATE 100 MCG: 50 INJECTION, SOLUTION INTRAMUSCULAR; INTRAVENOUS at 16:32

## 2021-05-25 RX ADMIN — FENTANYL CITRATE 100 MCG: 50 INJECTION, SOLUTION INTRAMUSCULAR; INTRAVENOUS at 17:00

## 2021-05-25 ASSESSMENT — PULMONARY FUNCTION TESTS
PIF_VALUE: 20
PIF_VALUE: 16
PIF_VALUE: 2
PIF_VALUE: 14
PIF_VALUE: 22
PIF_VALUE: 21
PIF_VALUE: 20
PIF_VALUE: 22
PIF_VALUE: 20
PIF_VALUE: 23
PIF_VALUE: 22
PIF_VALUE: 21
PIF_VALUE: 22
PIF_VALUE: 22
PIF_VALUE: 16
PIF_VALUE: 21
PIF_VALUE: 23
PIF_VALUE: 21
PIF_VALUE: 22
PIF_VALUE: 21
PIF_VALUE: 21
PIF_VALUE: 3
PIF_VALUE: 16
PIF_VALUE: 21
PIF_VALUE: 21
PIF_VALUE: 20
PIF_VALUE: 23
PIF_VALUE: 21
PIF_VALUE: 14
PIF_VALUE: 21
PIF_VALUE: 1
PIF_VALUE: 8
PIF_VALUE: 1
PIF_VALUE: 7
PIF_VALUE: 4
PIF_VALUE: 21
PIF_VALUE: 21
PIF_VALUE: 23
PIF_VALUE: 9
PIF_VALUE: 1
PIF_VALUE: 1
PIF_VALUE: 21
PIF_VALUE: 21
PIF_VALUE: 20
PIF_VALUE: 22
PIF_VALUE: 20
PIF_VALUE: 22
PIF_VALUE: 18
PIF_VALUE: 1
PIF_VALUE: 22
PIF_VALUE: 5
PIF_VALUE: 21
PIF_VALUE: 21

## 2021-05-25 ASSESSMENT — PAIN DESCRIPTION - LOCATION
LOCATION: ABDOMEN
LOCATION: ABDOMEN

## 2021-05-25 ASSESSMENT — PAIN SCALES - GENERAL
PAINLEVEL_OUTOF10: 7
PAINLEVEL_OUTOF10: 5
PAINLEVEL_OUTOF10: 0
PAINLEVEL_OUTOF10: 8

## 2021-05-25 ASSESSMENT — PAIN DESCRIPTION - DIRECTION: RADIATING_TOWARDS: LAP SITES

## 2021-05-25 ASSESSMENT — PAIN DESCRIPTION - FREQUENCY: FREQUENCY: INTERMITTENT

## 2021-05-25 ASSESSMENT — LIFESTYLE VARIABLES: SMOKING_STATUS: 0

## 2021-05-25 ASSESSMENT — PAIN DESCRIPTION - DESCRIPTORS
DESCRIPTORS: DISCOMFORT
DESCRIPTORS: ACHING;DISCOMFORT

## 2021-05-25 ASSESSMENT — PAIN DESCRIPTION - PAIN TYPE
TYPE: SURGICAL PAIN
TYPE: SURGICAL PAIN

## 2021-05-25 ASSESSMENT — PAIN DESCRIPTION - PROGRESSION: CLINICAL_PROGRESSION: NOT CHANGED

## 2021-05-25 ASSESSMENT — PAIN DESCRIPTION - ORIENTATION
ORIENTATION: MID;LEFT;RIGHT
ORIENTATION: RIGHT;LEFT;MID

## 2021-05-25 ASSESSMENT — PAIN DESCRIPTION - ONSET: ONSET: ON-GOING

## 2021-05-25 ASSESSMENT — PAIN SCALES - WONG BAKER: WONGBAKER_NUMERICALRESPONSE: 6

## 2021-05-25 NOTE — OP NOTE
Operative Note      Patient: Cheo Holly  YOB: 1950  MRN: [id number]    Date of Procedure: 5/25/2021    Pre-Op Diagnosis: gallstone pancreatitis     Post-Op Diagnosis: Same       Procedure(s):  CHOLECYSTECTOMY LAPAROSCOPIC ROBOTIC XI    Surgeon(s):  Issac Mancia MD    Assistant:   Resident: Edward Pedro DO    Anesthesia: General    Estimated Blood Loss (mL): Minimal    Complications: None    Specimens:   ID Type Source Tests Collected by Time Destination   A : GALLBLADDER Tissue Gallbladder SURGICAL PATHOLOGY Issac Mancia MD 5/25/2021 1707        Implants:  * No implants in log *      Drains: * No LDAs found *    Findings: abdominal wall adhesions from prior gastric bypass    Indications:    79year old male with history, physical, laboratory and imaging findings consistent with gallstone pancreatitis. Cholecystectomy was recommended. Risks, benefits, alternatives (and risks of alternatives) of surgery were discussed with the patient, which include but are not limited to, bleeding, infection, bile leak, bile duct injury, conversion to open, bowel injury, further procedures, hernia formation, risk of anesthesia, blood clots, pneumonia, heart attack and stroke. Patient understands these risk and agrees to proceed. Detailed Description of Procedure: The patient was brought to the operating room and positioned supine on the OR table. Sequential compression devices were placed on the patient's lower extremities and functioning. Preoperative antibiotics were administered. Anesthesia was obtained without complication as per the anesthesia record. Immediately prior to the procedure a time-out was called and the surgical checklist was reviewed and agreed upon by all present. The patient was prepped and draped in the usual sterile fashion and the procedure went forth with strict aseptic technique under maximal barrier precautions. A stab incision was made in the LUQ.  A Veress needle was inserted and confirmed to be in place with the saline drip test. The abdomen was insufflated to 15 mmHg. A 8mm incision was made below this and a 8mm trocar port was placed under Optiview technique. The abdomen was inspected. There was significant midline abdominal wall adhesions. An 8mm port was placed in the right lateral mid abdomen and using blunt dissection the midline adhesions were taken down. In a similar fashion, two more 8mm ports were inserted supraumbilical and right mid abdomen as well. The robot was docked and prograsp foreceps as well as monopolar hook cautery were inserted. Electrocautery was used to dissect the gallbladder off its peritoneal attachments. The base of the gallbladder was then identified and the cystic duct was skeletonized. The cystic artery was then skeletonized in similar fashion. After obtaining our critical view of safety,  Three clips were placed on the cystic duct distal and one proximal. The duct was transected with scissors. The cystic artery identified along with an accessory branch which two clips were placed proximal on both and then transected with cautery. Electrocautery was then used to dissect the gallbladder off the gallbladder fossa. After freeing up the gallbladder from its attachments, it was placed in a laparoscopic bag and removed through one of the port sites. Bleeding points on the liver bed were cauterized. Hemostasis was observed. Clips remained in place. The abdomen was decompressed and the remaining ports were removed. The skin was then closed with 4-0 Vicryl inverted interrupted dermal sutures. The port sites were infiltrated with local anesthetic. The incisions were cleaned and dried, and Dermaflex was applied. Needle, sponge, and instrument counts were reported as correct x2. The patient tolerated the procedure well without complications. Dr. Mehrdad Villegas was present and scrubbed throughout the case.     DISPOSITION: Anesthesia was discontinued and the patient was extubated prior to leaving the OR. He was transferred to the recovery area in good condition.      Electronically signed by Daron Ivory DO on 5/25/2021 at 6:00 PM

## 2021-05-25 NOTE — PROGRESS NOTES
[address] [place] [address] [facility]  Progress Note    SUBJECTIVE:  Chief Complaint   Patient presents with    Altered Mental Status     Seen Recently for Heat Stroke Per Family; Disoriented to Time/Situation     Patient is tolerating medications. Lying in bed. Wife present. He is feeling well today. Had MRCP last evening. He is from [place] but is agreeable to surgical intervention while he is here. Review of systems:  As stated above in the chief complaint, otherwise negative. Medications:  Scheduled Meds:   LORazepam  1 mg Oral See Admin Instructions    Or    LORazepam  0.5 mg Oral See Admin Instructions    heparin (porcine)  5,000 Units Subcutaneous 3 times per day    cefTRIAXone (ROCEPHIN) IV  2,000 mg Intravenous Q24H    levothyroxine  200 mcg Oral Daily    metoprolol tartrate  25 mg Oral BID    [Held by provider] ramipril  1.25 mg Oral Daily    tamsulosin  0.4 mg Oral Daily    sodium chloride flush  5-40 mL Intravenous 2 times per day     Continuous Infusions:   dextrose 5% and 0.45% NaCl with KCl 20 mEq 100 mL/hr at 21 1715    sodium chloride       PRN Meds:sodium chloride flush, sodium chloride, promethazine **OR** ondansetron, polyethylene glycol, acetaminophen **OR** acetaminophen    OBJECTIVE:  /65   Pulse 65   Temp 98.9 °F (37.2 °C) (Oral)   Resp 16   Ht 5' 10\" (1.778 m)   Wt 236 lb (107 kg)   SpO2 98%   BMI 33.86 kg/m²   Temp  Av.8 °F (36.6 °C)  Min: 97 °F (36.1 °C)  Max: 98.9 °F (37.2 °C)  Constitutional: The patient is awake, alert, and oriented. Lying in bed. In no distress. Skin: Warm and dry. No rashes were noted. HEENT: Round and reactive pupils. Moist mucous membranes. No ulcerations or thrush. Neck: Supple to movements. Chest: No respiratory distress. Symmetrical expansion. No wheezing, crackles or rhonchi. Cardiovascular: S1 and S2 are rhythmic and regular. No murmurs appreciated.    Abdomen: Positive bowel sounds to if there are clinical findings of cholecystitis. 3. Questionable tiny stone in the mid common duct with evaluation limited by   motion artifact.  No associated findings of biliary obstruction. 4. Mild hepatomegaly in conjunction with coarsened echotexture   sonographically suggest underlying hepatocellular disease.  No significant   periportal edema is seen to suggest acute hepatitis, which is not excluded. 5. Trace perisplenic ascites is likely reactive. Microbiology:   5/23/2021 blood cultures 4/4 bottles - GNR (E.coli by PCR)  5/24/2021 blood culture - pending   5/23/2021 urine culture - negative       ASSESSMENT:  · Probable gallstone pancreatitis  · Possible cholecystitis versus cholangitis  · E. coli bacteremia associated to the above  · Elevation of transaminases and pancreatic enzymes associated to the above, improving   · Leukopenia secondary to sepsis    PLAN:  · Continue Ceftriaxone 2g IV  · Await final blood culture ID & sensitivities  · Repeat blood cultures pending  · Monitor labs - transaminases & pancreatic enzymes trending down  · Surgery following     ANABELL Hernandez - CNP  9:24 AM  5/25/2021     Patient seen and examined. I had a face to face encounter with the patient. Agree with exam.  Assessment and plan as outlined above and directed by me. Addition and corrections were done as deemed appropriate. My exam and plan include: The patient is tolerating antibiotics well. No pain. Wife and daughter at bedside. He will be going for cholecystectomy today. Continue Ceftriaxone. If there is no description of bile spillage and we will most likely change the IV to an oral antibiotic when possible.     Gallo Doherty MD  5/25/2021  2:57 PM

## 2021-05-25 NOTE — PROGRESS NOTES
GENERAL SURGERY  DAILY PROGRESS NOTE  5/25/2021    Cc: abd pain    Subjective: Tolerated clears last night. Denies nausea, vomiting. Pain not worse      Objective:  /65   Pulse 72   Temp 98.4 °F (36.9 °C) (Oral)   Resp 16   Ht 5' 10\" (1.778 m)   Wt 236 lb (107 kg)   SpO2 99%   BMI 33.86 kg/m²     GENERAL:  Laying in bed, awake, alert, cooperative, no apparent distress  HEAD: Normocephalic, atraumatic  EYES: No sclera icterus, pupils equal  LUNGS:  No increased work of breathing  CARDIOVASCULAR:  Regular rate  ABDOMEN:  Soft, non-tender, non-distended  EXTREMITIES: No edema or swelling  SKIN: Warm and dry, no jaundice    Assessment/Plan:  79 y.o. male w/ gallstone pancreatitis, hyperbili; possible acute breonna    RUQ US with cholelithiasis  Am labs pending - monitor bilirubin  MRCP, done but read pending  Patient from out of town, would like to try to have cholecystectomy at home in [place] if possible. Likely advance to [facility] if MRCP ok      Will be d/w Dr. Ryan Gasca    Electronically signed by Jeffery Yeh DO on 5/25/2021 at 5:18 AM    Attending Note:    Patient seen/examined 5/25/2021. Agree with above assessment and plan except patient and wife have decided to proceed with cholecystectomy this admission. Risks, benefits, alteratives (and risks of alternatives) of the procedure were discussed with patient at bedside, all questions answered. He understands and agrees to proceed.

## 2021-05-25 NOTE — PLAN OF CARE
Problem: Falls - Risk of:  Goal: Absence of physical injury  Description: Absence of physical injury  Outcome: Met This Shift     Problem: Pain:  Goal: Control of acute pain  Description: Control of acute pain  Outcome: Met This Shift

## 2021-05-25 NOTE — PROGRESS NOTES
[facility]   Progress Note    Admitting Date and Time: 5/23/2021  4:12 AM  Admit Dx: Cholecystitis [K81.9]    Subjective: Admitted on 22nd, patient with prior gastric bypass, IVC filter, CABG 3 years ago, diabetes, hypertension, hypothyroidism, came with abdominal discomfort, initial work-up with cholelithiasis and concern for cholecystitis, patient with lactic acidosis, patient compensated from cardiovascular standpoint, cleared from cardiac side for surgery. Cardiology do recommend cautious periprocedural fluid administration. Seen by surgery, possible acute cholecystitis, ultrasound showed only cholelithiasis and no sonographic evidence of cholecystitis. Gram stain from blood culture bottle showing gram-negative organism, ID evaluated,\" from pancreatitis, possible cholecystitis versus cholangitis, E. coli bacteremia associated with above, ceftriaxone 2 g IV started. MRI showing changes of pancreatitis, interstitial edema, cholelithiasis. Patient was admitted with Cholecystitis [K81.9]. Patient feels much better, at this time awake, alert, up and about, wife present in the room, patient communicates well, does inform that they have decided to go for surgery. Per RN: Patient was started on Rocephin, patient is to go for cholecystectomy at 5 PM today, patient will stay in town for 2 weeks and then will go back to [place] after the post surgery follow-up. ROS: denies fever, chills, cp, sob, n/v, HA unless stated above.      LORazepam  1 mg Oral See Admin Instructions    Or    LORazepam  0.5 mg Oral See Admin Instructions    heparin (porcine)  5,000 Units Subcutaneous 3 times per day    cefTRIAXone (ROCEPHIN) IV  2,000 mg Intravenous Q24H    levothyroxine  200 mcg Oral Daily    metoprolol tartrate  25 mg Oral BID    [Held by provider] ramipril  1.25 mg Oral Daily    tamsulosin  0.4 mg Oral Daily    sodium chloride flush  5-40 mL Intravenous 2 times per day     sodium chloride flush, 5-40 mL, PRN  sodium chloride, 25 mL, PRN  promethazine, 12.5 mg, Q6H PRN   Or  ondansetron, 4 mg, Q6H PRN  polyethylene glycol, 17 g, Daily PRN  acetaminophen, 650 mg, Q6H PRN   Or  acetaminophen, 650 mg, Q6H PRN         Objective:    /65   Pulse 65   Temp 98.9 °F (37.2 °C) (Oral)   Resp 16   Ht 5' 10\" (1.778 m)   Wt 236 lb (107 kg)   SpO2 98%   BMI 33.86 kg/m²   General Appearance: alert and oriented to person, place and time, well-developed and well-nourished, in no acute distress  Skin: warm and dry, no rash or erythema  Head: normocephalic and atraumatic  Eyes: pupils equal, round, and reactive to light, extraocular eye movements intact, conjunctivae normal  ENT: tympanic membrane, external ear and ear canal normal bilaterally, oropharynx clear and moist with normal mucous membranes  Neck: neck supple and non tender without mass, no thyromegaly or thyroid nodules, no cervical lymphadenopathy   Pulmonary/Chest: clear to auscultation bilaterally- no wheezes, rales or rhonchi, normal air movement, no respiratory distress  Cardiovascular: normal rate, normal S1 and S2, no gallops, intact distal pulses and no carotid bruits  Abdomen: soft, non-tender, non-distended, normal bowel sounds, no masses or organomegaly      Recent Labs     05/24/21  0445 05/24/21  1330 05/25/21  0315    136 137   K 4.0 3.8 3.8    103 105   CO2 23 25 24   BUN 18 15 13   CREATININE 0.7 0.8 0.8   GLUCOSE 182* 157* 207*   CALCIUM 8.3* 8.6 8.2*       Recent Labs     05/23/21  0427 05/24/21  1330 05/25/21  0315   WBC 8.5 4.1* 3.4*   RBC 4.66 4.40 4.07   HGB 10.9* 10.2* 9.5*   HCT 35.8* 34.2* 31.4*   MCV 76.8* 77.7* 77.1*   MCH 23.4* 23.2* 23.3*   MCHC 30.4* 29.8* 30.3*   RDW 18.0* 18.1* 17.9*   * 101* 95*   MPV 9.9 10.3 10.0       Gram stain from blood culture with gram-negative rods.   Enterobacter cloacae, E. coli detected  Lipase has decreased to 196    Radiology:   MRI ABDOMEN WO CONTRAST MRCP Final Result   1. Mild changes of acute pancreatitis appearing most prominent in the   pancreatic body and tail. Evaluation for necrotizing pancreatitis is limited   given lack of intravenous contrast administration, but interstitial edematous   pancreatitis is favored over necrotizing pancreatitis given the CT appearance. 2. Cholelithiasis. Edematous gallbladder wall thickening is isolated to the   gallbladder fossa and could be seen with cholecystitis but is more suggestive   of adjacent liver disease or a systemic process resulting in volume overload   such as hypoproteinemia. Consider further evaluation with a nuclear medicine   hepatobiliary scan if there are clinical findings of cholecystitis. 3. Questionable tiny stone in the mid common duct with evaluation limited by   motion artifact. No associated findings of biliary obstruction. 4. Mild hepatomegaly in conjunction with coarsened echotexture   sonographically suggest underlying hepatocellular disease. No significant   periportal edema is seen to suggest acute hepatitis, which is not excluded. 5. Trace perisplenic ascites is likely reactive. US GALLBLADDER RUQ   Final Result   Cholelithiasis. No sonographic evidence of acute cholecystitis. CT ABDOMEN PELVIS W IV CONTRAST Additional Contrast? None   Final Result   Cholelithiasis. Pericholecystic fluid, concerning for acute cholecystitis. Surgical consultation recommended. Stones within the urinary bladder. Nonobstructing bilateral renal stones. Other chronic appearing findings. CT HEAD WO CONTRAST   Final Result   No acute intracranial abnormality. MRI would be useful if symptoms persist.         XR CHEST PORTABLE   Final Result   Diminished lung volume without acute process otherwise identified.   PA and   lateral views would be useful for further assessment, if symptoms persist.             Assessment:    Active Problems:    Cholecystitis  Resolved Problems:    * No resolved hospital problems. *      Plan:  1. Abdominal discomfort, cholelithiasis, suspected cholecystitis. Surgery evaluated, gallbladder ultrasound with cholelithiasis, MRCP with changes of pancreatitis. Improving lipase. 2. Kidney stones, not new to the patient, bladder stones, not an active issue. 3. CAD, s/p CABG, patient seen by cardiology, cleared for surgical intervention. 4. Diabetes, will hold Metformin, on sliding scale, not well controlled, will start 10 of Lantus. A1c of 7.4.  5. Hypothyroidism, will continue home dose of Synthroid  6. Hyperlipidemia, patient is on statins. 7. BPH, stable on Flomax, continued. 8. Lactic acidosis, recheck lactic acid in 6 hours, continue IV fluids   9. Cholelithiasis, gallstone pancreatitis, elevated lipase, patient is planned for surgery later on today  10. S/p gastric bypass. 11. Abnormal LFTs, more related to cholelithiasis. 12. Gram-negative, E. coli bacteremia, ID on board, patient remains on Rocephin. Patient with significant improvement. 13.  DVT prophylaxis, will start on subcu heparin.           Electronically signed by Renetta Partida MD on 5/25/2021 at 9:15 AM

## 2021-05-26 LAB
ALBUMIN SERPL-MCNC: 3.4 G/DL (ref 3.5–5.2)
ALP BLD-CCNC: 218 U/L (ref 40–129)
ALT SERPL-CCNC: 145 U/L (ref 0–40)
ANION GAP SERPL CALCULATED.3IONS-SCNC: 9 MMOL/L (ref 7–16)
ANISOCYTOSIS: ABNORMAL
AST SERPL-CCNC: 77 U/L (ref 0–39)
BASOPHILS ABSOLUTE: 0.01 E9/L (ref 0–0.2)
BASOPHILS RELATIVE PERCENT: 0.1 % (ref 0–2)
BILIRUB SERPL-MCNC: 0.4 MG/DL (ref 0–1.2)
BILIRUBIN DIRECT: <0.2 MG/DL (ref 0–0.3)
BILIRUBIN, INDIRECT: ABNORMAL MG/DL (ref 0–1)
BLOOD CULTURE, ROUTINE: ABNORMAL
BUN BLDV-MCNC: 14 MG/DL (ref 6–23)
BURR CELLS: ABNORMAL
CALCIUM SERPL-MCNC: 8.9 MG/DL (ref 8.6–10.2)
CHLORIDE BLD-SCNC: 102 MMOL/L (ref 98–107)
CO2: 23 MMOL/L (ref 22–29)
CREAT SERPL-MCNC: 0.8 MG/DL (ref 0.7–1.2)
CULTURE, BLOOD 2: ABNORMAL
EOSINOPHILS ABSOLUTE: 0 E9/L (ref 0.05–0.5)
EOSINOPHILS RELATIVE PERCENT: 0 % (ref 0–6)
GFR AFRICAN AMERICAN: >60
GFR NON-AFRICAN AMERICAN: >60 ML/MIN/1.73
GLUCOSE BLD-MCNC: 230 MG/DL (ref 74–99)
HCT VFR BLD CALC: 36.8 % (ref 37–54)
HEMOGLOBIN: 11.1 G/DL (ref 12.5–16.5)
IMMATURE GRANULOCYTES #: 0.07 E9/L
IMMATURE GRANULOCYTES %: 1 % (ref 0–5)
LIPASE: 61 U/L (ref 13–60)
LYMPHOCYTES ABSOLUTE: 0.55 E9/L (ref 1.5–4)
LYMPHOCYTES RELATIVE PERCENT: 8.2 % (ref 20–42)
MCH RBC QN AUTO: 23.1 PG (ref 26–35)
MCHC RBC AUTO-ENTMCNC: 30.2 % (ref 32–34.5)
MCV RBC AUTO: 76.5 FL (ref 80–99.9)
MONOCYTES ABSOLUTE: 0.4 E9/L (ref 0.1–0.95)
MONOCYTES RELATIVE PERCENT: 6 % (ref 2–12)
NEUTROPHILS ABSOLUTE: 5.64 E9/L (ref 1.8–7.3)
NEUTROPHILS RELATIVE PERCENT: 84.7 % (ref 43–80)
ORGANISM: ABNORMAL
ORGANISM: ABNORMAL
OVALOCYTES: ABNORMAL
PDW BLD-RTO: 18.2 FL (ref 11.5–15)
PLATELET # BLD: 157 E9/L (ref 130–450)
PMV BLD AUTO: 10.3 FL (ref 7–12)
POIKILOCYTES: ABNORMAL
POTASSIUM SERPL-SCNC: 4.4 MMOL/L (ref 3.5–5)
RBC # BLD: 4.81 E12/L (ref 3.8–5.8)
SODIUM BLD-SCNC: 134 MMOL/L (ref 132–146)
TOTAL PROTEIN: 6.5 G/DL (ref 6.4–8.3)
WBC # BLD: 6.7 E9/L (ref 4.5–11.5)

## 2021-05-26 PROCEDURE — 99233 SBSQ HOSP IP/OBS HIGH 50: CPT | Performed by: INTERNAL MEDICINE

## 2021-05-26 PROCEDURE — 2580000003 HC RX 258: Performed by: STUDENT IN AN ORGANIZED HEALTH CARE EDUCATION/TRAINING PROGRAM

## 2021-05-26 PROCEDURE — 2580000003 HC RX 258: Performed by: REGISTERED NURSE

## 2021-05-26 PROCEDURE — 80076 HEPATIC FUNCTION PANEL: CPT

## 2021-05-26 PROCEDURE — 6370000000 HC RX 637 (ALT 250 FOR IP): Performed by: STUDENT IN AN ORGANIZED HEALTH CARE EDUCATION/TRAINING PROGRAM

## 2021-05-26 PROCEDURE — 6360000002 HC RX W HCPCS: Performed by: STUDENT IN AN ORGANIZED HEALTH CARE EDUCATION/TRAINING PROGRAM

## 2021-05-26 PROCEDURE — 6360000002 HC RX W HCPCS: Performed by: REGISTERED NURSE

## 2021-05-26 PROCEDURE — 6370000000 HC RX 637 (ALT 250 FOR IP): Performed by: INTERNAL MEDICINE

## 2021-05-26 PROCEDURE — 83690 ASSAY OF LIPASE: CPT

## 2021-05-26 PROCEDURE — 99232 SBSQ HOSP IP/OBS MODERATE 35: CPT | Performed by: INTERNAL MEDICINE

## 2021-05-26 PROCEDURE — 36415 COLL VENOUS BLD VENIPUNCTURE: CPT

## 2021-05-26 PROCEDURE — 1200000000 HC SEMI PRIVATE

## 2021-05-26 PROCEDURE — 80048 BASIC METABOLIC PNL TOTAL CA: CPT

## 2021-05-26 PROCEDURE — 85025 COMPLETE CBC W/AUTO DIFF WBC: CPT

## 2021-05-26 RX ORDER — OXYCODONE HYDROCHLORIDE AND ACETAMINOPHEN 5; 325 MG/1; MG/1
1 TABLET ORAL EVERY 6 HOURS PRN
Qty: 28 TABLET | Refills: 0 | Status: SHIPPED | OUTPATIENT
Start: 2021-05-26 | End: 2021-06-02

## 2021-05-26 RX ORDER — AMOXICILLIN 250 MG
2 CAPSULE ORAL DAILY PRN
Qty: 60 TABLET | Refills: 0 | Status: SHIPPED | OUTPATIENT
Start: 2021-05-26

## 2021-05-26 RX ADMIN — METOPROLOL TARTRATE 25 MG: 25 TABLET, FILM COATED ORAL at 22:09

## 2021-05-26 RX ADMIN — Medication 10 ML: at 22:12

## 2021-05-26 RX ADMIN — OXYCODONE 10 MG: 5 TABLET ORAL at 00:34

## 2021-05-26 RX ADMIN — SODIUM CHLORIDE, PRESERVATIVE FREE 10 ML: 5 INJECTION INTRAVENOUS at 12:35

## 2021-05-26 RX ADMIN — HEPARIN SODIUM 5000 UNITS: 10000 INJECTION INTRAVENOUS; SUBCUTANEOUS at 13:43

## 2021-05-26 RX ADMIN — Medication 10 ML: at 08:09

## 2021-05-26 RX ADMIN — ACETAMINOPHEN 650 MG: 325 TABLET ORAL at 22:12

## 2021-05-26 RX ADMIN — METOPROLOL TARTRATE 25 MG: 25 TABLET, FILM COATED ORAL at 08:08

## 2021-05-26 RX ADMIN — RAMIPRIL 1.25 MG: 1.25 CAPSULE ORAL at 08:08

## 2021-05-26 RX ADMIN — OXYCODONE 10 MG: 5 TABLET ORAL at 14:47

## 2021-05-26 RX ADMIN — HEPARIN SODIUM 5000 UNITS: 10000 INJECTION INTRAVENOUS; SUBCUTANEOUS at 22:10

## 2021-05-26 RX ADMIN — LEVOTHYROXINE SODIUM 200 MCG: 100 TABLET ORAL at 06:45

## 2021-05-26 RX ADMIN — OXYCODONE 10 MG: 5 TABLET ORAL at 06:17

## 2021-05-26 RX ADMIN — ERTAPENEM SODIUM 1000 MG: 1 INJECTION, POWDER, LYOPHILIZED, FOR SOLUTION INTRAMUSCULAR; INTRAVENOUS at 10:59

## 2021-05-26 RX ADMIN — TAMSULOSIN HYDROCHLORIDE 0.4 MG: 0.4 CAPSULE ORAL at 08:09

## 2021-05-26 ASSESSMENT — PAIN DESCRIPTION - ONSET
ONSET: ON-GOING
ONSET: ON-GOING

## 2021-05-26 ASSESSMENT — PAIN DESCRIPTION - PROGRESSION
CLINICAL_PROGRESSION: NOT CHANGED
CLINICAL_PROGRESSION: NOT CHANGED

## 2021-05-26 ASSESSMENT — PAIN DESCRIPTION - LOCATION
LOCATION: ABDOMEN

## 2021-05-26 ASSESSMENT — PAIN SCALES - GENERAL
PAINLEVEL_OUTOF10: 7
PAINLEVEL_OUTOF10: 5
PAINLEVEL_OUTOF10: 6
PAINLEVEL_OUTOF10: 7
PAINLEVEL_OUTOF10: 7
PAINLEVEL_OUTOF10: 8
PAINLEVEL_OUTOF10: 8

## 2021-05-26 ASSESSMENT — PAIN DESCRIPTION - PAIN TYPE
TYPE: SURGICAL PAIN

## 2021-05-26 ASSESSMENT — PAIN DESCRIPTION - DESCRIPTORS
DESCRIPTORS: ACHING;DISCOMFORT
DESCRIPTORS: ACHING;DISCOMFORT

## 2021-05-26 ASSESSMENT — PAIN DESCRIPTION - ORIENTATION
ORIENTATION: RIGHT;LEFT;MID

## 2021-05-26 ASSESSMENT — PAIN DESCRIPTION - FREQUENCY
FREQUENCY: INTERMITTENT
FREQUENCY: INTERMITTENT

## 2021-05-26 NOTE — PROGRESS NOTES
INPATIENT CARDIOLOGY FOLLOW-UP    Name: Alxe Dickson    Age: 79 y.o. Date of Admission: 5/23/2021  4:12 AM    Date of Service: 5/26/2021    Chief Complaint: Follow-up for coronary atherosclerosis, hypertension, cholelithiasis, moderate obesity    Interim History: The patient is presently compensated from a cardiovascular standpoint and tolerated his cholecystectomy without significant difficulties. Mild postoperative discomfort is presently noted. Systolic hypertension is present in the absence of his normal antihypertensive medical regimen with reassessment of renal function electrolytes pending postoperatively. Review of Systems: The remainder of a complete multisystem review including consitutional, central nervous, respiratory, circulatory, gastrointestinal, genitourinary, endocrinologic, hematologic, musculoskeletal and psychiatric are negative.     Problem List:  Patient Active Problem List   Diagnosis    Cholecystitis       Allergies:  No Known Allergies    Current Medications:  Current Facility-Administered Medications   Medication Dose Route Frequency Provider Last Rate Last Admin    cefTRIAXone (ROCEPHIN) 2,000 mg in sterile water 20 mL IV syringe  2,000 mg Intravenous Q24H Peyton Abt, DO        oxyCODONE (ROXICODONE) immediate release tablet 5 mg  5 mg Oral Q4H PRN Peyton Abt, DO        Or    oxyCODONE (ROXICODONE) immediate release tablet 10 mg  10 mg Oral Q4H PRN Peyton Abt, DO   10 mg at 05/26/21 0617    morphine (PF) injection 2 mg  2 mg Intravenous Q3H PRN Peyton Abt, DO   2 mg at 05/25/21 2052    LORazepam (ATIVAN) tablet 1 mg  1 mg Oral See Admin Instructions Peyton Abt, DO   1 mg at 05/24/21 2033    Or    LORazepam (ATIVAN) tablet 0.5 mg  0.5 mg Oral See Admin Instructions Peyton Abt, DO        heparin (porcine) injection 5,000 Units  5,000 Units Subcutaneous 3 times per day Peyton Abt, DO   5,000 Units at 05/25/21 2325    levothyroxine (SYNTHROID) tablet 200 mcg  200 mcg Oral Daily Manny Gorman, DO   200 mcg at 05/26/21 0645    metoprolol tartrate (LOPRESSOR) tablet 25 mg  25 mg Oral BID Manny Gorman, DO   25 mg at 05/25/21 2332    ramipril (ALTACE) capsule 1.25 mg  1.25 mg Oral Daily Meir Guaman MD   1.25 mg at 05/23/21 1158    tamsulosin (FLOMAX) capsule 0.4 mg  0.4 mg Oral Daily Manny Gorman, DO   0.4 mg at 05/25/21 9563    sodium chloride flush 0.9 % injection 5-40 mL  5-40 mL Intravenous 2 times per day Manny Gorman, DO   10 mL at 05/25/21 2332    sodium chloride flush 0.9 % injection 5-40 mL  5-40 mL Intravenous PRN Manny Gorman, DO        0.9 % sodium chloride infusion  25 mL Intravenous PRN Manny Gorman, DO        promethazine (PHENERGAN) tablet 12.5 mg  12.5 mg Oral Q6H PRN Manny Gorman, DO        Or    ondansetron [facility]) injection 4 mg  4 mg Intravenous Q6H PRN Manny Gorman, DO        polyethylene glycol (GLYCOLAX) packet 17 g  17 g Oral Daily PRN Manny Gorman, DO        acetaminophen (TYLENOL) tablet 650 mg  650 mg Oral Q6H PRN Manny Gorman, DO   650 mg at 05/24/21 1928    Or    acetaminophen (TYLENOL) suppository 650 mg  650 mg Rectal Q6H PRN Manny Gorman, DO          sodium chloride         Physical Exam:  BP (!) 153/72   Pulse 80   Temp 98.5 °F (36.9 °C) (Oral)   Resp 16   Ht 5' 10\" (1.778 m)   Wt 236 lb (107 kg)   SpO2 94%   BMI 33.86 kg/m²   Weight change: Wt Readings from Last 3 Encounters:   05/23/21 236 lb (107 kg)   05/21/21 236 lb (107 kg)     The patient is awake, alert and in no discomfort or distress. No gross musculoskeletal deformity is present. No significant skin or nail changes are present. Gross examination of head, eyes, nose and throat are negative. Jugular venous pressure is normal and no carotid bruits are present. Normal respiratory effort is noted with no accessory muscle usage present.  Lung fields are clear to ascultation. Cardiac examination is notable for a regular rate and rhythm with no palpable thrill. No gallop rhythm or cardiac murmur are identified. A benign abdominal examination is present with no masses or organomegaly. Intact pulses are present throughout all extremities and no peripheral edema is present. No focal neurologic deficits are present. Intake/Output:    Intake/Output Summary (Last 24 hours) at 5/26/2021 0704  Last data filed at 5/25/2021 2332  Gross per 24 hour   Intake 800 ml   Output 1190 ml   Net -390 ml     No intake/output data recorded. Laboratory Tests:  Lab Results   Component Value Date    CREATININE 0.8 05/25/2021    BUN 13 05/25/2021     05/25/2021    K 3.8 05/25/2021     05/25/2021    CO2 24 05/25/2021     No results for input(s): CKTOTAL, CKMB in the last 72 hours.     Invalid input(s): TROPONONI  No results found for: BNP  Lab Results   Component Value Date    WBC 3.4 05/25/2021    RBC 4.07 05/25/2021    HGB 9.5 05/25/2021    HCT 31.4 05/25/2021    MCV 77.1 05/25/2021    MCH 23.3 05/25/2021    MCHC 30.3 05/25/2021    RDW 17.9 05/25/2021    PLT 95 05/25/2021    MPV 10.0 05/25/2021     Recent Labs     05/24/21  0445 05/24/21  1330 05/25/21  0315   ALKPHOS 238* 227* 211*   * 209* 165*   * 130* 89*   PROT 5.1* 5.8* 4.8*   BILITOT 0.8 0.7 0.4   BILIDIR  --   --  <0.2   LABALBU 3.2* 3.0* 2.9*     No results found for: MG  No results found for: PROTIME, INR  Lab Results   Component Value Date    TSH 2.000 05/23/2021     No components found for: CHLPL  Lab Results   Component Value Date    TRIG 65 05/23/2021     Lab Results   Component Value Date    HDL 26 05/23/2021     Lab Results   Component Value Date    LDLCALC 47 05/23/2021       Cardiac Tests: none available      ASSESSMENT / PLAN:   On a clinical basis, the patient presently appears compensated from a cardiovascular standpoint following his cholecystectomy with no perioperative cardiovascular difficulties. He is presently hypertensive with plans of resumption of his ramapril will be resumed with need of careful monitoring of his blood pressure as well as that of his renal function and electrolytes postoperatively. Additional management will be deferred to the general surgical service and infectious disease service in the face of his bacteremia. On a long-term basis ongoing appropriate lifestyle modification to achieve weight reduction will benefit diastolic cardiac performance as well as that of needs of ongoing aggressive risk factor modification of blood pressure and serum lipids and attempt to reduce risk of future atherosclerotic development. We will further evaluate him during hospitalization should additional cardiovascular difficulties or concerns arise. Note: This report was completed utilizing computer voice recognition software. Every effort has been made to ensure accuracy, however; inadvertent computerized transcription errors may be present. Lizbeth Vazquez.  Lubna Rosario, [facility]

## 2021-05-26 NOTE — CARE COORDINATION
Updated discharge plan of care. POD#1 lap cholecystectomy. Pt now with positive blood cultures. ID consult pending, ID placed on Invanz daily 1gm IV. If needed, receptive to the infusion center if necessary.  Will continue to follow-MJO

## 2021-05-26 NOTE — PROGRESS NOTES
Clarified with Dr. Laureano Bowen that Ultrasound and Biopsy of L breast needs done while patient is inpatient.  Left message with Laila Cespedes at [facility] to schedule

## 2021-05-26 NOTE — PROGRESS NOTES
[address] [place] [address] [facility]  Progress Note    SUBJECTIVE:  Chief Complaint   Patient presents with    Altered Mental Status     Seen Recently for Heat Stroke Per Family; Disoriented to Time/Situation     Patient is tolerating medications. No fevers overnight. Tolerating a diet. Pain well controlled. S/p lap breonna yesterday afternoon      Review of systems:  As stated above in the chief complaint, otherwise negative. Medications:  Scheduled Meds:   LORazepam  1 mg Oral See Admin Instructions    Or    LORazepam  0.5 mg Oral See Admin Instructions    heparin (porcine)  5,000 Units Subcutaneous 3 times per day    levothyroxine  200 mcg Oral Daily    metoprolol tartrate  25 mg Oral BID    ramipril  1.25 mg Oral Daily    tamsulosin  0.4 mg Oral Daily    sodium chloride flush  5-40 mL Intravenous 2 times per day     Continuous Infusions:   sodium chloride       PRN Meds:oxyCODONE **OR** oxyCODONE, morphine, sodium chloride flush, sodium chloride, promethazine **OR** ondansetron, polyethylene glycol, acetaminophen **OR** acetaminophen    OBJECTIVE:  /63   Pulse 76   Temp 98.3 °F (36.8 °C) (Oral)   Resp 16   Ht 5' 10\" (1.778 m)   Wt 236 lb (107 kg)   SpO2 94%   BMI 33.86 kg/m²   Temp  Av °F (36.7 °C)  Min: 96.8 °F (36 °C)  Max: 98.6 °F (37 °C)  Constitutional: The patient is resting comfortably, awakens easily. In no distress. Skin: Warm and dry. No rashes were noted. HEENT: Round and reactive pupils. Moist mucous membranes. No ulcerations or thrush. Neck: Supple to movements. Chest: No respiratory distress. Symmetrical expansion. No wheezing, crackles or rhonchi. Cardiovascular: S1 and S2 are rhythmic and regular. No murmurs appreciated. Abdomen: Positive bowel sounds to auscultation. Mild tenderness to palpation around the lap sites. Lap sites closed with surgical glue - well approximated. Extremities: No edema.   Lines: peripheral    Laboratory and Tests Review:  Lab Results   Component Value Date    WBC 6.7 05/26/2021    WBC 3.4 (L) 05/25/2021    WBC 4.1 (L) 05/24/2021    HGB 11.1 (L) 05/26/2021    HCT 36.8 (L) 05/26/2021    MCV 76.5 (L) 05/26/2021     05/26/2021     Lab Results   Component Value Date    NEUTROABS 2.29 05/25/2021    NEUTROABS 3.57 05/24/2021    NEUTROABS 8.08 (H) 05/23/2021     No results found for: [facility]  Lab Results   Component Value Date     (H) 05/25/2021    AST 89 (H) 05/25/2021    ALKPHOS 211 (H) 05/25/2021    BILITOT 0.4 05/25/2021     Lab Results   Component Value Date     05/25/2021    K 3.8 05/25/2021    K 4.0 05/24/2021     05/25/2021    CO2 24 05/25/2021    BUN 13 05/25/2021    CREATININE 0.8 05/25/2021    CREATININE 0.8 05/24/2021    CREATININE 0.7 05/24/2021    GFRAA >60 05/25/2021    LABGLOM >60 05/25/2021    GLUCOSE 207 05/25/2021    PROT 4.8 05/25/2021    LABALBU 2.9 05/25/2021    CALCIUM 8.2 05/25/2021    BILITOT 0.4 05/25/2021    ALKPHOS 211 05/25/2021    AST 89 05/25/2021     05/25/2021     Lab Results   Component Value Date    CRP 10.0 (H) 05/24/2021     Lab Results   Component Value Date    SEDRATE 55 (H) 05/24/2021     Radiology:  MRCP:  1. Mild changes of acute pancreatitis appearing most prominent in the   pancreatic body and tail. Evaluation for necrotizing pancreatitis is limited   given lack of intravenous contrast administration, but interstitial edematous   pancreatitis is favored over necrotizing pancreatitis given the CT appearance. 2. Cholelithiasis. Edematous gallbladder wall thickening is isolated to the   gallbladder fossa and could be seen with cholecystitis but is more suggestive   of adjacent liver disease or a systemic process resulting in volume overload   such as hypoproteinemia. Consider further evaluation with a nuclear medicine   hepatobiliary scan if there are clinical findings of cholecystitis.    3. Questionable tiny stone in the mid common duct with evaluation limited by   motion artifact. No associated findings of biliary obstruction. 4. Mild hepatomegaly in conjunction with coarsened echotexture   sonographically suggest underlying hepatocellular disease. No significant   periportal edema is seen to suggest acute hepatitis, which is not excluded. 5. Trace perisplenic ascites is likely reactive. Microbiology:   5/23/2021 blood cultures 4/4 bottles - ESBL + E.coli   5/24/2021 blood culture - negative   5/23/2021 urine culture - negative       ASSESSMENT:  Probable gallstone pancreatitis  Possible cholecystitis versus cholangitis  E. coli bacteremia associated to the above  Sepsis secondary to the above. The patient presents with tachycardia, hypotension, fever and bacteremia. Elevation of transaminases and pancreatic enzymes associated to the above, improving   Leukopenia secondary to sepsis  Status post laparoscopic cholecystectomy - no report of bile leakage     PLAN:  Change to Ertapenem IV  Surgery following- s/p lap breonna 5/25/21  Will discuss final discharge plan with Dr. Basilio Jacinto, APRN - CNP  9:11 AM  5/26/2021     Patient seen and examined. I had a face to face encounter with the patient. Agree with exam.  Assessment and plan as outlined above and directed by me. Addition and corrections were done as deemed appropriate. My exam and plan include: Patient is doing well. He had a cholecystectomy. We will give him Ertapenem today and tomorrow. Start Levofloxacin tomorrow. He can be discharged on 7 days of Levofloxacin. Spoke with wife.     Albino Marquez MD  5/26/2021  2:34 PM

## 2021-05-26 NOTE — PROGRESS NOTES
Physician Progress Note      Juanita Rodriguez  [facility] #:                  [id number]  :                       1950  ADMIT DATE:       2021 4:12 AM  [address] [place] DATE:  RESPONDING  PROVIDER #:        Tabatha Allred MD          QUERY TEXT:    Dr. Slade Guerrero,    Patient admitted with gallstone pancreatitis. Noted documentation of sepsis in   the  consult note. In order to support the diagnosis of sepsis, please   include additional clinical indicators in your documentation. Or please   document if the diagnosis of sepsis has been ruled out after further study    The medical record reflects the following:  Risk Factors: gallstone pancreatitis, possible acute cholecystitis  Clinical Indicators: max temp 100.1, , leukopenia, per the  consult   note \"Leukopenia secondary to sepsis\", E coli bacteremia  Treatment: IV antibiotics, repeat blood cultures    Thank you,  Herman Ibrahim RN  Clinical Documentation Improvement  Options provided:  -- Sepsis present as evidenced by, Please document evidence.   -- Sepsis was ruled out after study  -- Other - I will add my own diagnosis  -- Disagree - Not applicable / Not valid  -- Disagree - Clinically unable to determine / Unknown  -- Refer to Clinical Documentation Reviewer    PROVIDER RESPONSE TEXT:    Sepsis is present as evidenced by See my note    Query created by: Omar Fitzgerald on 2021 12:55 PM      Electronically signed by:  Tabatha Allred MD 2021 3:16 PM

## 2021-05-26 NOTE — ANESTHESIA POSTPROCEDURE EVALUATION
Department of Anesthesiology  Postprocedure Note    Patient: Wilfredo Courtney  MRN: [id number]  YOB: 1950  Date of evaluation: 5/25/2021  Time:  9:57 PM     Procedure Summary     Date: 05/25/21 Room / Location: [address]    Anesthesia Start: 8189 Anesthesia Stop: 1807    Procedure: CHOLECYSTECTOMY LAPAROSCOPIC ROBOTIC XI (N/A Abdomen) Diagnosis: (-)    Surgeons: Alva Noriega MD Responsible Provider: Bridget Blanchard MD    Anesthesia Type: general ASA Status: 3          Anesthesia Type: general    Britany Phase I: Britany Score: 9    Britany Phase II:      Last vitals: Reviewed and per EMR flowsheets.        Anesthesia Post Evaluation    Patient location during evaluation: PACU  Patient participation: complete - patient participated  Level of consciousness: awake  Airway patency: patent  Nausea & Vomiting: no vomiting and no nausea  Complications: no  Cardiovascular status: hemodynamically stable  Respiratory status: acceptable  Hydration status: stable

## 2021-05-26 NOTE — PROGRESS NOTES
[facility] Hospital[facility]   Progress Note    Admitting Date and Time: 5/23/2021  4:12 AM  Admit Dx: Cholecystitis [K81.9]    Subjective: Admitted on 22nd, patient with prior gastric bypass, IVC filter, CABG 3 years ago, diabetes, hypertension, hypothyroidism, came with abdominal discomfort, initial work-up with cholelithiasis and concern for cholecystitis, patient with lactic acidosis, patient compensated from cardiovascular standpoint, cleared from cardiac side for surgery. Cardiology do recommend cautious periprocedural fluid administration. Seen by surgery, possible acute cholecystitis, ultrasound showed only cholelithiasis and no sonographic evidence of cholecystitis. Gram stain from blood culture bottle showing gram-negative organism, ID evaluated,\" from pancreatitis, possible cholecystitis versus cholangitis, E. coli bacteremia associated with above, ceftriaxone 2 g IV started. MRI showing changes of pancreatitis, interstitial edema, cholelithiasis. Patient did undergo laparoscopic cholecystectomy on 5/25. General surgery okay with DC later today. Antibiotics changed to ertapenem by ID. Patient was admitted with Cholecystitis [K81.9]. Patient feels much better, at this time awake, alert, up and about, does inform that he went all the way to end of the hallway and came back. Did eat last evening as well as this morning, also tried some solids and has tolerated it well. Per RN: Has ESBL. ROS: denies fever, chills, cp, sob, n/v, HA unless stated above.      cefTRIAXone (ROCEPHIN) IV  2,000 mg Intravenous Q24H    LORazepam  1 mg Oral See Admin Instructions    Or    LORazepam  0.5 mg Oral See Admin Instructions    heparin (porcine)  5,000 Units Subcutaneous 3 times per day    levothyroxine  200 mcg Oral Daily    metoprolol tartrate  25 mg Oral BID    ramipril  1.25 mg Oral Daily    tamsulosin  0.4 mg Oral Daily    sodium chloride flush  5-40 mL Intravenous 2 times per day oxyCODONE, 5 mg, Q4H PRN   Or  oxyCODONE, 10 mg, Q4H PRN  morphine, 2 mg, Q3H PRN  sodium chloride flush, 5-40 mL, PRN  sodium chloride, 25 mL, PRN  promethazine, 12.5 mg, Q6H PRN   Or  ondansetron, 4 mg, Q6H PRN  polyethylene glycol, 17 g, Daily PRN  acetaminophen, 650 mg, Q6H PRN   Or  acetaminophen, 650 mg, Q6H PRN         Objective:    BP (!) 153/72   Pulse 80   Temp 98.5 °F (36.9 °C) (Oral)   Resp 16   Ht 5' 10\" (1.778 m)   Wt 236 lb (107 kg)   SpO2 94%   BMI 33.86 kg/m²   General Appearance: alert and oriented to person, place and time, well-developed and well-nourished, in no acute distress  Skin: warm and dry, no rash or erythema  Head: normocephalic and atraumatic  Eyes: pupils equal, round, and reactive to light, extraocular eye movements intact, conjunctivae normal  ENT: tympanic membrane, external ear and ear canal normal bilaterally, oropharynx clear and moist with normal mucous membranes  Neck: neck supple and non tender without mass, no thyromegaly or thyroid nodules, no cervical lymphadenopathy   Pulmonary/Chest: clear to auscultation bilaterally- no wheezes, rales or rhonchi, normal air movement, no respiratory distress  Cardiovascular: normal rate, normal S1 and S2, no gallops, intact distal pulses and no carotid bruits  Abdomen: soft, non-tender, non-distended, normal bowel sounds, no masses or organomegaly      Recent Labs     05/24/21  0445 05/24/21  1330 05/25/21  0315    136 137   K 4.0 3.8 3.8    103 105   CO2 23 25 24   BUN 18 15 13   CREATININE 0.7 0.8 0.8   GLUCOSE 182* 157* 207*   CALCIUM 8.3* 8.6 8.2*       Recent Labs     05/24/21  1330 05/25/21  0315   WBC 4.1* 3.4*   RBC 4.40 4.07   HGB 10.2* 9.5*   HCT 34.2* 31.4*   MCV 77.7* 77.1*   MCH 23.2* 23.3*   MCHC 29.8* 30.3*   RDW 18.1* 17.9*   * 95*   MPV 10.3 10.0       Gram stain from blood culture with gram-negative rods.   Enterobacter cloacae, E. coli detected  Lipase has decreased to 196    Radiology: MRI ABDOMEN WO CONTRAST MRCP   Final Result   1. Mild changes of acute pancreatitis appearing most prominent in the   pancreatic body and tail. Evaluation for necrotizing pancreatitis is limited   given lack of intravenous contrast administration, but interstitial edematous   pancreatitis is favored over necrotizing pancreatitis given the CT appearance. 2. Cholelithiasis. Edematous gallbladder wall thickening is isolated to the   gallbladder fossa and could be seen with cholecystitis but is more suggestive   of adjacent liver disease or a systemic process resulting in volume overload   such as hypoproteinemia. Consider further evaluation with a nuclear medicine   hepatobiliary scan if there are clinical findings of cholecystitis. 3. Questionable tiny stone in the mid common duct with evaluation limited by   motion artifact. No associated findings of biliary obstruction. 4. Mild hepatomegaly in conjunction with coarsened echotexture   sonographically suggest underlying hepatocellular disease. No significant   periportal edema is seen to suggest acute hepatitis, which is not excluded. 5. Trace perisplenic ascites is likely reactive. US GALLBLADDER RUQ   Final Result   Cholelithiasis. No sonographic evidence of acute cholecystitis. CT ABDOMEN PELVIS W IV CONTRAST Additional Contrast? None   Final Result   Cholelithiasis. Pericholecystic fluid, concerning for acute cholecystitis. Surgical consultation recommended. Stones within the urinary bladder. Nonobstructing bilateral renal stones. Other chronic appearing findings. CT HEAD WO CONTRAST   Final Result   No acute intracranial abnormality. MRI would be useful if symptoms persist.         XR CHEST PORTABLE   Final Result   Diminished lung volume without acute process otherwise identified.   PA and   lateral views would be useful for further assessment, if symptoms persist.             Assessment:    Active Problems:    Cholecystitis  Resolved Problems:    * No resolved hospital problems. *      Plan:  1. Abdominal discomfort, cholelithiasis, suspected cholecystitis. Surgery evaluated, gallbladder ultrasound with cholelithiasis, MRCP with changes of pancreatitis. Improving lipase. Now has undergone cholecystectomy, significant improvement. 2. Kidney stones, not new to the patient, bladder stones, not an active issue. 3. CAD, s/p CABG, patient seen by cardiology, cleared for surgical intervention. 4. Diabetes, will hold Metformin, on sliding scale, not well controlled, will start 10 of Lantus. A1c of 7.4.  5. Hypothyroidism, will continue home dose of Synthroid  6. Hyperlipidemia, patient is on statins. 7. BPH, stable on Flomax, continued. 8. Lactic acidosis, recheck lactic acid in 6 hours, continue IV fluids   9. Cholelithiasis, gallstone pancreatitis, elevated lipase, patient had surgery and is okay for DC from general surgery side. 10. S/p gastric bypass. 11. Abnormal LFTs, more related to cholelithiasis. 12. Gram-negative, E. coli bacteremia, ESBL ID on board, patient now on ertapenem. Patient with significant improvement. 13. DVT prophylaxis, will start on subcu heparin.   14. DC planning, likely later today once antibiotic therapy to be treatment by ID and able to complete course on outpatient basis.           Electronically signed by Jody Russo MD on 5/26/2021 at 7:48 AM

## 2021-05-27 VITALS
WEIGHT: 236 LBS | TEMPERATURE: 98.4 F | BODY MASS INDEX: 33.79 KG/M2 | SYSTOLIC BLOOD PRESSURE: 128 MMHG | OXYGEN SATURATION: 98 % | HEART RATE: 80 BPM | RESPIRATION RATE: 16 BRPM | HEIGHT: 70 IN | DIASTOLIC BLOOD PRESSURE: 69 MMHG

## 2021-05-27 LAB
ALBUMIN SERPL-MCNC: 3.1 G/DL (ref 3.5–5.2)
ALP BLD-CCNC: 181 U/L (ref 40–129)
ALT SERPL-CCNC: 112 U/L (ref 0–40)
ANION GAP SERPL CALCULATED.3IONS-SCNC: 5 MMOL/L (ref 7–16)
AST SERPL-CCNC: 61 U/L (ref 0–39)
BASOPHILS ABSOLUTE: 0.03 E9/L (ref 0–0.2)
BASOPHILS RELATIVE PERCENT: 0.5 % (ref 0–2)
BILIRUB SERPL-MCNC: 0.5 MG/DL (ref 0–1.2)
BILIRUBIN DIRECT: <0.2 MG/DL (ref 0–0.3)
BILIRUBIN, INDIRECT: ABNORMAL MG/DL (ref 0–1)
BUN BLDV-MCNC: 15 MG/DL (ref 6–23)
CALCIUM SERPL-MCNC: 8.7 MG/DL (ref 8.6–10.2)
CHLORIDE BLD-SCNC: 104 MMOL/L (ref 98–107)
CO2: 29 MMOL/L (ref 22–29)
CREAT SERPL-MCNC: 0.9 MG/DL (ref 0.7–1.2)
EOSINOPHILS ABSOLUTE: 0.03 E9/L (ref 0.05–0.5)
EOSINOPHILS RELATIVE PERCENT: 0.5 % (ref 0–6)
GFR AFRICAN AMERICAN: >60
GFR NON-AFRICAN AMERICAN: >60 ML/MIN/1.73
GLUCOSE BLD-MCNC: 183 MG/DL (ref 74–99)
HCT VFR BLD CALC: 32.9 % (ref 37–54)
HEMOGLOBIN: 10.3 G/DL (ref 12.5–16.5)
IMMATURE GRANULOCYTES #: 0.05 E9/L
IMMATURE GRANULOCYTES %: 0.9 % (ref 0–5)
LIPASE: 59 U/L (ref 13–60)
LYMPHOCYTES ABSOLUTE: 1.14 E9/L (ref 1.5–4)
LYMPHOCYTES RELATIVE PERCENT: 20.3 % (ref 20–42)
MCH RBC QN AUTO: 23.9 PG (ref 26–35)
MCHC RBC AUTO-ENTMCNC: 31.3 % (ref 32–34.5)
MCV RBC AUTO: 76.3 FL (ref 80–99.9)
MONOCYTES ABSOLUTE: 0.49 E9/L (ref 0.1–0.95)
MONOCYTES RELATIVE PERCENT: 8.7 % (ref 2–12)
MRSA CULTURE ONLY: NORMAL
NEUTROPHILS ABSOLUTE: 3.87 E9/L (ref 1.8–7.3)
NEUTROPHILS RELATIVE PERCENT: 69.1 % (ref 43–80)
PDW BLD-RTO: 18.3 FL (ref 11.5–15)
PLATELET # BLD: 160 E9/L (ref 130–450)
PMV BLD AUTO: 10.7 FL (ref 7–12)
POTASSIUM SERPL-SCNC: 4.1 MMOL/L (ref 3.5–5)
RBC # BLD: 4.31 E12/L (ref 3.8–5.8)
SODIUM BLD-SCNC: 138 MMOL/L (ref 132–146)
TOTAL PROTEIN: 5.9 G/DL (ref 6.4–8.3)
WBC # BLD: 5.6 E9/L (ref 4.5–11.5)

## 2021-05-27 PROCEDURE — 80048 BASIC METABOLIC PNL TOTAL CA: CPT

## 2021-05-27 PROCEDURE — 99239 HOSP IP/OBS DSCHRG MGMT >30: CPT | Performed by: INTERNAL MEDICINE

## 2021-05-27 PROCEDURE — 85025 COMPLETE CBC W/AUTO DIFF WBC: CPT

## 2021-05-27 PROCEDURE — 6370000000 HC RX 637 (ALT 250 FOR IP): Performed by: INTERNAL MEDICINE

## 2021-05-27 PROCEDURE — 2580000003 HC RX 258: Performed by: REGISTERED NURSE

## 2021-05-27 PROCEDURE — 6370000000 HC RX 637 (ALT 250 FOR IP): Performed by: STUDENT IN AN ORGANIZED HEALTH CARE EDUCATION/TRAINING PROGRAM

## 2021-05-27 PROCEDURE — 36415 COLL VENOUS BLD VENIPUNCTURE: CPT

## 2021-05-27 PROCEDURE — 6370000000 HC RX 637 (ALT 250 FOR IP): Performed by: SPECIALIST

## 2021-05-27 PROCEDURE — 83690 ASSAY OF LIPASE: CPT

## 2021-05-27 PROCEDURE — 6360000002 HC RX W HCPCS: Performed by: REGISTERED NURSE

## 2021-05-27 PROCEDURE — 2580000003 HC RX 258: Performed by: STUDENT IN AN ORGANIZED HEALTH CARE EDUCATION/TRAINING PROGRAM

## 2021-05-27 PROCEDURE — 80076 HEPATIC FUNCTION PANEL: CPT

## 2021-05-27 RX ORDER — LEVOFLOXACIN 750 MG/1
750 TABLET ORAL DAILY
Qty: 7 TABLET | Refills: 0 | Status: SHIPPED | OUTPATIENT
Start: 2021-05-28 | End: 2021-06-04

## 2021-05-27 RX ADMIN — ERTAPENEM SODIUM 1000 MG: 1 INJECTION, POWDER, LYOPHILIZED, FOR SOLUTION INTRAMUSCULAR; INTRAVENOUS at 11:37

## 2021-05-27 RX ADMIN — LEVOFLOXACIN 750 MG: 500 TABLET, FILM COATED ORAL at 08:58

## 2021-05-27 RX ADMIN — METOPROLOL TARTRATE 25 MG: 25 TABLET, FILM COATED ORAL at 08:58

## 2021-05-27 RX ADMIN — RAMIPRIL 1.25 MG: 1.25 CAPSULE ORAL at 08:58

## 2021-05-27 RX ADMIN — ACETAMINOPHEN 650 MG: 325 TABLET ORAL at 05:31

## 2021-05-27 RX ADMIN — LEVOTHYROXINE SODIUM 200 MCG: 100 TABLET ORAL at 05:31

## 2021-05-27 RX ADMIN — TAMSULOSIN HYDROCHLORIDE 0.4 MG: 0.4 CAPSULE ORAL at 08:58

## 2021-05-27 RX ADMIN — OXYCODONE 5 MG: 5 TABLET ORAL at 12:42

## 2021-05-27 RX ADMIN — Medication 10 ML: at 09:00

## 2021-05-27 ASSESSMENT — PAIN SCALES - GENERAL
PAINLEVEL_OUTOF10: 6
PAINLEVEL_OUTOF10: 6
PAINLEVEL_OUTOF10: 3

## 2021-05-27 ASSESSMENT — PAIN DESCRIPTION - PAIN TYPE: TYPE: ACUTE PAIN

## 2021-05-27 ASSESSMENT — PAIN DESCRIPTION - LOCATION: LOCATION: ABDOMEN

## 2021-05-27 ASSESSMENT — PAIN DESCRIPTION - ORIENTATION: ORIENTATION: RIGHT;MID

## 2021-05-27 NOTE — PROGRESS NOTES
[address] [place] [address] [facility]  Progress Note  Face to face encounter   SUBJECTIVE:  Chief Complaint   Patient presents with    Altered Mental Status     Seen Recently for Heat Stroke Per Family; Disoriented to Time/Situation     Patient is tolerating medications. No fevers overnight. Tolerating a diet. No fevers. Doing well today- slight pain to right side. S/p lap breonna    Wife at bedside. Review of systems:  As stated above in the chief complaint, otherwise negative. Medications:  Scheduled Meds:   ertapenem (INVanz) IVPB  1,000 mg Intravenous Q24H    levoFLOXacin  750 mg Oral Daily    LORazepam  1 mg Oral See Admin Instructions    Or    LORazepam  0.5 mg Oral See Admin Instructions    heparin (porcine)  5,000 Units Subcutaneous 3 times per day    levothyroxine  200 mcg Oral Daily    metoprolol tartrate  25 mg Oral BID    ramipril  1.25 mg Oral Daily    tamsulosin  0.4 mg Oral Daily    sodium chloride flush  5-40 mL Intravenous 2 times per day     Continuous Infusions:   sodium chloride       PRN Meds:oxyCODONE **OR** oxyCODONE, morphine, sodium chloride flush, sodium chloride, promethazine **OR** ondansetron, polyethylene glycol, acetaminophen **OR** acetaminophen    OBJECTIVE:  /62   Pulse 80   Temp 98.4 °F (36.9 °C) (Oral)   Resp 16   Ht 5' 10\" (1.778 m)   Wt 236 lb (107 kg)   SpO2 97%   BMI 33.86 kg/m²   Temp  Av.8 °F (36.6 °C)  Min: 97.5 °F (36.4 °C)  Max: 98.4 °F (36.9 °C)  Constitutional: The patient is in bed. In no distress. Skin: Warm and dry. No rashes were noted. HEENT: Round and reactive pupils. Moist mucous membranes. No ulcerations or thrush. Neck: Supple to movements. Chest: No respiratory distress. Symmetrical expansion. No wheezing, crackles or rhonchi. Cardiovascular: S1 and S2 are rhythmic and regular. No murmurs appreciated. Abdomen: Positive bowel sounds to auscultation.  Mild tenderness to palpation around the lap sites and to right side. Lap sites closed with surgical glue - well approximated. Extremities: No edema. Lines: peripheral    Laboratory and Tests Review:  Lab Results   Component Value Date    WBC 5.6 05/27/2021    WBC 6.7 05/26/2021    WBC 3.4 (L) 05/25/2021    HGB 10.3 (L) 05/27/2021    HCT 32.9 (L) 05/27/2021    MCV 76.3 (L) 05/27/2021     05/27/2021     Lab Results   Component Value Date    NEUTROABS 3.87 05/27/2021    NEUTROABS 5.64 05/26/2021    NEUTROABS 2.29 05/25/2021     No results found for: [facility]  Lab Results   Component Value Date     (H) 05/27/2021    AST 61 (H) 05/27/2021    ALKPHOS 181 (H) 05/27/2021    BILITOT 0.5 05/27/2021     Lab Results   Component Value Date     05/27/2021    K 4.1 05/27/2021    K 4.0 05/24/2021     05/27/2021    CO2 29 05/27/2021    BUN 15 05/27/2021    CREATININE 0.9 05/27/2021    CREATININE 0.8 05/26/2021    CREATININE 0.8 05/25/2021    GFRAA >60 05/27/2021    LABGLOM >60 05/27/2021    GLUCOSE 183 05/27/2021    PROT 5.9 05/27/2021    LABALBU 3.1 05/27/2021    CALCIUM 8.7 05/27/2021    BILITOT 0.5 05/27/2021    ALKPHOS 181 05/27/2021    AST 61 05/27/2021     05/27/2021     Lab Results   Component Value Date    CRP 10.0 (H) 05/24/2021     Lab Results   Component Value Date    SEDRATE 55 (H) 05/24/2021     Radiology:  MRCP:  1. Mild changes of acute pancreatitis appearing most prominent in the   pancreatic body and tail. Evaluation for necrotizing pancreatitis is limited   given lack of intravenous contrast administration, but interstitial edematous   pancreatitis is favored over necrotizing pancreatitis given the CT appearance. 2. Cholelithiasis. Edematous gallbladder wall thickening is isolated to the   gallbladder fossa and could be seen with cholecystitis but is more suggestive   of adjacent liver disease or a systemic process resulting in volume overload   such as hypoproteinemia.   Consider further evaluation with a nuclear medicine

## 2021-05-27 NOTE — PROGRESS NOTES
GENERAL SURGERY  DAILY PROGRESS NOTE  5/27/2021    Cc: abd pain    Subjective: Tolerating diet. Pain wc      Objective:  /66   Pulse 63   Temp 97.9 °F (36.6 °C) (Temporal)   Resp 18   Ht 5' 10\" (1.778 m)   Wt 236 lb (107 kg)   SpO2 96%   BMI 33.86 kg/m²     GENERAL:  Laying in bed, awake, alert, cooperative, no apparent distress  HEAD: Normocephalic, atraumatic  EYES: No sclera icterus, pupils equal  LUNGS:  No increased work of breathing  CARDIOVASCULAR:  Regular rate  ABDOMEN:  Soft, appropriately tender, non-distended. Incisions c/d/i. EXTREMITIES: No edema or swelling  SKIN: Warm and dry, no jaundice    Assessment/Plan:  79 y.o. male w/ gallstone pancreatitis, hyperbili; possible acute breonna    Gen diet  Positive blood cultures with E. coli.   ID consulted    Ok to d/c from a surgical perspective      Electronically signed by Imani Cordero MD on 5/27/2021 at 5:21 AM

## 2021-05-27 NOTE — PLAN OF CARE
Problem: Falls - Risk of:  Goal: Will remain free from falls  Description: Will remain free from falls  Outcome: Met This Shift  Goal: Absence of physical injury  Description: Absence of physical injury  Outcome: Met This Shift     Problem: Pain:  Goal: Pain level will decrease  Description: Pain level will decrease  Outcome: Met This Shift  Goal: Control of acute pain  Description: Control of acute pain  Outcome: Met This Shift  Goal: Control of chronic pain  Description: Control of chronic pain  Outcome: Met This Shift     Problem: Infection - Surgical Site:  Goal: Will show no infection signs and symptoms  Description: Will show no infection signs and symptoms  Outcome: Met This Shift

## 2021-05-27 NOTE — DISCHARGE SUMMARY
[facility] Physician Discharge Summary       Mary Ann Mahoney MD  [address] [facility]  Kyler Chappell [address] [place] [id number]    In 1 week  For follow up      Activity level: As tolerated     Dispo:  Home    Condition on discharge: Stable     Patient ID:  Ruben Jorgensen  [id number]  31 y.o.  1950    Admit date: 5/23/2021    Discharge date and time:  5/27/2021  2:29 PM    Admission Diagnoses: Active Problems:    Cholecystitis  Resolved Problems:    * No resolved hospital problems. *      Discharge Diagnoses: Active Problems:    Cholecystitis  Resolved Problems:    * No resolved hospital problems. *      Consults:  IP CONSULT TO GENERAL SURGERY  IP CONSULT TO CARDIOLOGY  IP CONSULT TO IV TEAM  IP CONSULT TO INFECTIOUS DISEASES  IP CONSULT TO PHARMACY  IP CONSULT TO IV TEAM      Hospital Course:   Patient Ruben Jorgensen is a 79 y.o. presented with abd pain, found to be in sepsis and acute Cholecystitis [K81.9]    1. Sepsis POA with ESBL ecoli bacteremia, due to cholecystits vs cholangitis, treated with ertapenem, switched to levaquin for discharge appreciate ID input. Consider stopping ertapenem. 2.  Acute cholecystitis, s/p lap-breonna  On 5/25/2021, no reported of bile leakage. abd pain improved. 3.  Hx of CAD, s/p CABG, apprecaite cardiology input, stable,continue metoprolol  4. HTN, continue on ramipril. 5.  Lactic acidosis, due to sepsis, improved.    7.  Elevated transaminases, due to acute breonna, improving,     Discharge Exam:  General Appearance: alert and oriented to person, place and time and in no acute distress  Skin: warm and dry  Head: normocephalic and atraumatic  Eyes: pupils equal, round, and reactive to light, extraocular eye movements intact, conjunctivae normal  Neck: neck supple and non tender without mass   Pulmonary/Chest: clear to auscultation bilaterally- no wheezes, rales or rhonchi, normal air movement, no respiratory distress  Cardiovascular: normal rate, normal S1 and S2 and no carotid bruits  Abdomen: soft, very mild tenderness RLQ at the site of probe. , non-distended, normal bowel sounds, no masses or organomegaly  Extremities: no cyanosis, no clubbing and no edema  Neurologic: no cranial nerve deficit and speech normal    I/O last 3 completed shifts: In: 960 [P.O.:960]  Out: 1300 [Urine:1300]  No intake/output data recorded. LABS:  Recent Labs     05/25/21 0315 05/26/21 0815 05/27/21 0411    134 138   K 3.8 4.4 4.1    102 104   CO2 24 23 29   BUN 13 14 15   CREATININE 0.8 0.8 0.9   GLUCOSE 207* 230* 183*   CALCIUM 8.2* 8.9 8.7       Recent Labs     05/25/21 0315 05/26/21 0815 05/27/21 0411   WBC 3.4* 6.7 5.6   RBC 4.07 4.81 4.31   HGB 9.5* 11.1* 10.3*   HCT 31.4* 36.8* 32.9*   MCV 77.1* 76.5* 76.3*   MCH 23.3* 23.1* 23.9*   MCHC 30.3* 30.2* 31.3*   RDW 17.9* 18.2* 18.3*   PLT 95* 157 160   MPV 10.0 10.3 10.7       No results for input(s): POCGLU in the last 72 hours. Imaging:  CT HEAD WO CONTRAST    Result Date: 5/23/2021  EXAMINATION: CT OF THE HEAD WITHOUT CONTRAST  5/23/2021 5:31 am TECHNIQUE: CT of the head was performed without the administration of intravenous contrast. Dose modulation, iterative reconstruction, and/or weight based adjustment of the mA/kV was utilized to reduce the radiation dose to as low as reasonably achievable. COMPARISON: 05/21/2021 HISTORY: ORDERING SYSTEM PROVIDED HISTORY: AMS TECHNOLOGIST PROVIDED HISTORY: Has a \"code stroke\" or \"stroke alert\" been called? ->No Reason for exam:->AMS Decision Support Exception - unselect if not a suspected or confirmed emergency medical condition->Emergency Medical Condition (MA) FINDINGS: BRAIN/VENTRICLES: There is no acute intracranial hemorrhage, mass effect or midline shift. No abnormal extra-axial fluid collection. The gray-white differentiation is maintained without evidence of an acute infarct. There is no evidence of hydrocephalus. Scattered vascular calcifications.  ORBITS: The visualized portion of the orbits demonstrate no acute abnormality. SINUSES: The visualized paranasal sinuses and mastoid air cells demonstrate no acute abnormality. SOFT TISSUES/SKULL:  No acute abnormality of the visualized skull or soft tissues. No acute intracranial abnormality. MRI would be useful if symptoms persist.     CT ABDOMEN PELVIS W IV CONTRAST Additional Contrast? None    Result Date: 5/23/2021  EXAMINATION: CT OF THE ABDOMEN AND PELVIS WITH CONTRAST 5/23/2021 5:45 am TECHNIQUE: CT of the abdomen and pelvis was performed with the administration of intravenous contrast. Multiplanar reformatted images are provided for review. Dose modulation, iterative reconstruction, and/or weight based adjustment of the mA/kV was utilized to reduce the radiation dose to as low as reasonably achievable. COMPARISON: None. HISTORY: ORDERING SYSTEM PROVIDED HISTORY: Confusion, transaminitis TECHNOLOGIST PROVIDED HISTORY: Reason for exam:->Confusion, transaminitis Additional Contrast?->None Decision Support Exception - unselect if not a suspected or confirmed emergency medical condition->Emergency Medical Condition (MA) FINDINGS: Numerous calcified stones in the gallbladder. There is also minimal pericholecystic fluid with some questionable mild gallbladder wall thickening. Liver is homogeneous. Spleen is normal in size. Pancreas is unremarkable. No adrenal mass. No hydronephrosis. Nonobstructing bilateral renal stones. The largest stone is in the left kidney measuring 1.6 cm. Largest right renal stone measures 3 mm. Inferior vena caval filter is identified. Scattered vascular calcifications. Assessment of bowel is limited without oral contrast.  No bowel obstruction. Patient is status post gastric bypass. Appendix is normal.  No obvious acute bowel related inflammatory change. There is a stent involving the left common iliac artery.   Multiple stones are present within the urinary bladder posteriorly towards the right. The largest of these measures 1.6 cm. Prostate gland is borderline prominent. Calcifications in the pelvis are most consistent with phleboliths. No free fluid, free air or localized fluid collection. Lung bases are clear. Sternotomy wires. Degenerative changes are present in the spine and pelvis. Cholelithiasis. Pericholecystic fluid, concerning for acute cholecystitis. Surgical consultation recommended. Stones within the urinary bladder. Nonobstructing bilateral renal stones. Other chronic appearing findings. US GALLBLADDER RUQ    Result Date: 5/23/2021  EXAMINATION: RIGHT UPPER QUADRANT ULTRASOUND 5/23/2021 2:39 pm COMPARISON: None. HISTORY: ORDERING SYSTEM PROVIDED HISTORY: cholecystitis TECHNOLOGIST PROVIDED HISTORY: Reason for exam:->cholecystitis What reading provider will be dictating this exam?->CRC FINDINGS: LIVER:  The liver demonstrates normal echogenicity without evidence of intrahepatic biliary ductal dilatation. BILIARY SYSTEM:  Gallbladder demonstrates small stones and sludge without evidence of pericholecystic fluid, wall thickening or stones. Common bile duct is within normal limits measuring 6-7 mm . RIGHT KIDNEY: The right kidney is grossly unremarkable without evidence of hydronephrosis. PANCREAS:  Visualized portions of the pancreas are unremarkable. OTHER: No evidence of right upper quadrant ascites. Cholelithiasis. No sonographic evidence of acute cholecystitis. XR CHEST PORTABLE    Result Date: 5/23/2021  EXAMINATION: ONE XRAY VIEW OF THE CHEST 5/23/2021 5:31 am COMPARISON: None. HISTORY: ORDERING SYSTEM PROVIDED HISTORY: fever TECHNOLOGIST PROVIDED HISTORY: Reason for exam:->fever FINDINGS: Sternotomy wires. Cardiac silhouette is borderline prominent. Lung volumes are diminished. No pneumothorax, consolidation or effusion identified. Diminished lung volume without acute process otherwise identified.   PA and lateral views would be useful for further assessment, if symptoms persist.     MRI ABDOMEN WO CONTRAST MRCP    Result Date: 5/25/2021  EXAMINATION: MRI OF THE ABDOMEN WITHOUT CONTRAST AND MRCP 5/24/2021 9:36 pm TECHNIQUE: Multiplanar multisequence MRI of the abdomen was performed without the administration of intravenous contrast.  After initial T2 axial and coronal images, thick slab, thin slab and 3D coronal MRCP sequences were obtained without the administration of intravenous contrast.  MIP images are provided for review. COMPARISON: Limited abdominal sonogram 05/23/2021, abdomen and pelvis CT 05/23/2021 HISTORY: ORDERING SYSTEM PROVIDED HISTORY: gallstone pancreatitis TECHNOLOGIST PROVIDED HISTORY: Reason for exam:->gallstone pancreatitis FINDINGS: Lack of intravenous contrast administration, partially limiting evaluation of the soft tissues and vasculature. Patient motion in some areas, partially limiting evaluation of those areas. GALLBLADDER:  Layering gallstones in the lumen. Edematous wall thickening isolated to the gallbladder fossa. Minimal focal adenomyomatosis in the fundus. No distention nor pericholecystic stranding. BILE DUCTS:  No intrahepatic nor extrahepatic biliary dilation. Questionable tiny stone in the mid common duct. No obvious ductal strictures. PANCREATIC DUCT:  Typical duct configuration without dilation. No obvious ductal filling defects nor strictures. Moderate parenchymal atrophy. Relatively indistinct parenchymal margins in the body and tail with subtle peripancreatic stranding. OTHER:  Clear included lungs. Normal heart size. No pleural nor pericardial effusions. Mild hepatomegaly. Normal appearance of the spleen, adrenal glands, and kidneys. Suboptimally seen changes of Pamela-en-Y gastric bypass. Otherwise normal course and caliber of the stomach, small bowel, and colon without obstruction. Normal appearance of the included urinary bladder dome. No evident abdominal lymphadenopathy.   Trace free intraperitoneal fluid adjacent to the spleen. Healed supraumbilical midline laparotomy incision. Laxity of the anterior and lateral abdominal wall musculature. No suspicious marrow lesions. Multilevel degenerative disc disease. 1. Mild changes of acute pancreatitis appearing most prominent in the pancreatic body and tail. Evaluation for necrotizing pancreatitis is limited given lack of intravenous contrast administration, but interstitial edematous pancreatitis is favored over necrotizing pancreatitis given the CT appearance. 2. Cholelithiasis. Edematous gallbladder wall thickening is isolated to the gallbladder fossa and could be seen with cholecystitis but is more suggestive of adjacent liver disease or a systemic process resulting in volume overload such as hypoproteinemia. Consider further evaluation with a nuclear medicine hepatobiliary scan if there are clinical findings of cholecystitis. 3. Questionable tiny stone in the mid common duct with evaluation limited by motion artifact. No associated findings of biliary obstruction. 4. Mild hepatomegaly in conjunction with coarsened echotexture sonographically suggest underlying hepatocellular disease. No significant periportal edema is seen to suggest acute hepatitis, which is not excluded. 5. Trace perisplenic ascites is likely reactive. Patient Instructions:      Medication List      START taking these medications    levoFLOXacin 750 MG tablet  Commonly known as: LEVAQUIN  Take 1 tablet by mouth daily for 7 days  Start taking on: May 28, 2021     oxyCODONE-acetaminophen 5-325 MG per tablet  Commonly known as: Percocet  Take 1 tablet by mouth every 6 hours as needed for Pain for up to 7 days. Intended supply: 7 days.  Take lowest dose possible to manage pain     senna-docusate 8.6-50 MG per tablet  Commonly known as: PERICOLACE  Take 2 tablets by mouth daily as needed for Constipation        CONTINUE taking these medications    Accu-Chek FastClix Lancets [facility] Accu-Chek Guide strip  Generic drug: blood glucose test strips     aspirin 81 MG EC tablet     atorvastatin 20 MG tablet  Commonly known as: LIPITOR     levothyroxine 200 MCG tablet  Commonly known as: SYNTHROID     metFORMIN 500 MG extended release tablet  Commonly known as: GLUCOPHAGE-XR     metoprolol tartrate 25 MG tablet  Commonly known as: LOPRESSOR     nitroGLYCERIN 0.4 MG SL tablet  Commonly known as: NITROSTAT     ramipril 1.25 MG capsule  Commonly known as: ALTACE     tamsulosin 0.4 MG capsule  Commonly known as: FLOMAX           Where to Get Your Medications      [facility] medications [facility] [address] [place], [place] [address]  [address] [place] [address], [facility]    Phone: [phone]   · levoFLOXacin 750 MG tablet  · oxyCODONE-acetaminophen 5-325 MG per tablet  · senna-docusate 8.6-50 MG per tablet           Note that more than 30 minutes was spent in preparing discharge papers, discussing discharge with patient, medication review, etc.    Signed:  Electronically signed by Vignesh Low MD on 5/27/2021 at 2:29 PM

## 2021-05-29 LAB — BLOOD CULTURE, ROUTINE: NORMAL

## 2023-02-06 NOTE — ED PROVIDER NOTES
Patient is 40-year-old male who presents to the emergency department after LOC approximately 1430 today. 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 Been 85 to 90 degrees for both days. States that he has been trying to drink enough water however parked the tractor and got up to go in and then fainted. No history of such he says that his niece showed right after this and helped him up into the house and brought him to the hospital.  Patient was tachycardic upon arrival states that he feels generalized fatigue. He states he has been trying drink enough water but the tractor is not air conditioned. States he has also been outside in the sun constantly for the last 2 days try to help out on the farm. Denies any chest pain, abdominal pain, pain anywhere. States he got a Covid vaccine back in March. The history is provided by the patient. No  was used. Loss of Consciousness  Episode history:  Single  Most recent episode: Today  Timing:  Sporadic  Progression:  Resolved  Chronicity:  New  Context: dehydration    Witnessed: no    Relieved by:  Drinking  Worsened by:  Nothing  Ineffective treatments:  None tried  Associated symptoms: no chest pain, no fever, no headaches, no recent surgery, no shortness of breath, no vomiting and no weakness    Risk factors: coronary artery disease         Review of Systems   Constitutional: Positive for fatigue. Negative for chills and fever. HENT: Negative for ear pain and sore throat. Eyes: Negative for pain. Respiratory: Negative for cough and shortness of breath. Cardiovascular: Positive for syncope. Negative for chest pain. Gastrointestinal: Negative for abdominal pain, diarrhea and vomiting. Genitourinary: Negative for flank pain and penile pain. Musculoskeletal: Negative for back pain and neck pain. Skin: Negative. Negative for rash. Allergic/Immunologic: Negative. Trial addition of Soliqua 10 units every morning with gradual titration by 2 units every 5 days until fasting glucose is below 150 consistently - voucher given  Remain on Jardiance 25 mg daily for now - samples given  Discussed hopes that we could potentially eliminate Prandin once therapeutic dose of Suzie Heath is found which will hopefully alleviate some of his GI complaints  Monitor blood sugar 2 times/day and keep record to bring to next appointment  Continue chronic medications as prescribed  Monitor blood pressure 1-2 times/week and keep record to bring to next appointment  Encouraged to consider shingles vaccine (Shingrix) even if previously vaccinated with Zostavax (original live shingles vaccine). Advised that healthy adults 50 years and older should receive 2 doses of Shingrix -  by 2-6 months. Suggest checking with insurance to obtain coverage information.   A prescription will be printed and recommended to be purchased and administered at any local pharmacy Neurological: Positive for syncope. Negative for weakness and headaches. Physical Exam  Vitals and nursing note reviewed. Constitutional:       General: He is not in acute distress. Appearance: He is well-developed and normal weight. He is ill-appearing. HENT:      Head: Normocephalic and atraumatic. Right Ear: Tympanic membrane and external ear normal.      Left Ear: Tympanic membrane and external ear normal.      Mouth/Throat:      Mouth: Mucous membranes are moist.      Pharynx: Oropharynx is clear. Eyes:      Pupils: Pupils are equal, round, and reactive to light. Cardiovascular:      Rate and Rhythm: Regular rhythm. Tachycardia present. Heart sounds: Normal heart sounds. No murmur heard. Pulmonary:      Effort: Pulmonary effort is normal. No respiratory distress. Breath sounds: Normal breath sounds. Abdominal:      General: Bowel sounds are normal.      Palpations: Abdomen is soft. Tenderness: There is no abdominal tenderness. Musculoskeletal:      Cervical back: Neck supple. No muscular tenderness. Skin:     General: Skin is warm and dry. Neurological:      Mental Status: He is alert. Cranial Nerves: No cranial nerve deficit. Procedures      EKG: This EKG is signed and interpreted by me. Rate: 103  Rhythm: Sinus  Interpretation: sinus tachycardia  Comparison: no previous EKG       MDM  Number of Diagnoses or Management Options  Dehydration  Heat exhaustion, initial encounter  Diagnosis management comments: Patient is 68-year-old male presents emergency department with story likely heat exhaustion. Patient will assess orthostatics upon arrival is orthostatic positive. Is given a liter of fluid and observed in the emergency department for 3 and half hours.   Throughout this time he received a fluid and continue to improve regarding physical exam.  After receiving a liter of fluid and completing imaging and medical evaluation which were reassuring patient was no longer orthostatic positive he felt better and no lightheadedness or dizziness. Patient was told to be conscious when he is trying to work outside to not get too warm to take frequent breaks in side to make sure so he does not get heat exhaustion. ED Course as of May 21 1942   Fri May 21, 2021   1853 EKG: This EKG is signed and interpreted by the EP. Time: 18:47  Rate: 103  Rhythm: Sinus  Interpretation: Sinus tachycardia, nonspecfic s t changes  Comparison: stable as compared to patient's most recent EKG      [CF]      ED Course User Index  [CF] [place] AnahiDO yahaira        ED Course as of May 21 2039   Fri May 21, 2021   1853 EKG: This EKG is signed and interpreted by the EP. Time: 18:47  Rate: 103  Rhythm: Sinus  Interpretation: Sinus tachycardia, nonspecfic s t changes  Comparison: stable as compared to patient's most recent EKG      [CF]      ED Course User Index  [CF] [place] UrbinaremyDO yahaira       --------------------------------------------- PAST HISTORY ---------------------------------------------  Past Medical History:  has a past medical history of Diabetes mellitus ([facility] [address] and Thyroid disease. Past Surgical History:  has no past surgical history on file. Social History:  reports that he quit smoking about 36 years ago. He has never used smokeless tobacco. He reports current alcohol use. He reports that he does not use drugs. Family History: family history is not on file. The patients home medications have been reviewed. Allergies: Patient has no known allergies.     -------------------------------------------------- RESULTS -------------------------------------------------  Labs:  Results for orders placed or performed during the hospital encounter of 05/21/21   CBC Auto Differential   Result Value Ref Range    WBC 5.8 4.5 - 11.5 E9/L    RBC 4.38 3.80 - 5.80 E12/L    Hemoglobin 10.3 (L) 12.5 - 16.5 g/dL    Hematocrit 34.3 (L) 37.0 - 54.0 %    MCV 78.3 (L) 80.0 - 99.9 fL    MCH 23.5 (L) 26.0 - 35.0 pg    MCHC 30.0 (L) 32.0 - 34.5 %    RDW 17.5 (H) 11.5 - 15.0 fL    Platelets 278 (L) 836 - 450 E9/L    MPV 9.3 7.0 - 12.0 fL    Neutrophils % 93.6 (H) 43.0 - 80.0 %    Immature Granulocytes % 0.5 0.0 - 5.0 %    Lymphocytes % 3.8 (L) 20.0 - 42.0 %    Monocytes % 1.7 (L) 2.0 - 12.0 %    Eosinophils % 0.2 0.0 - 6.0 %    Basophils % 0.2 0.0 - 2.0 %    Neutrophils Absolute 5.45 1.80 - 7.30 E9/L    Immature Granulocytes # 0.03 E9/L    Lymphocytes Absolute 0.22 (L) 1.50 - 4.00 E9/L    Monocytes Absolute 0.10 0.10 - 0.95 E9/L    Eosinophils Absolute 0.01 (L) 0.05 - 0.50 E9/L    Basophils Absolute 0.01 0.00 - 0.20 E9/L    Anisocytosis 2+     Polychromasia 1+     Poikilocytes 1+     Ovalocytes 1+    Basic Metabolic Panel w/ Reflex to MG   Result Value Ref Range    Sodium 136 132 - 146 mmol/L    Potassium reflex Magnesium 3.7 3.5 - 5.0 mmol/L    Chloride 101 98 - 107 mmol/L    CO2 22 22 - 29 mmol/L    Anion Gap 13 7 - 16 mmol/L    Glucose 253 (H) 74 - 99 mg/dL    BUN 28 (H) 6 - 23 mg/dL    CREATININE 1.0 0.7 - 1.2 mg/dL    GFR Non-African American >60 >=60 mL/min/1.73    GFR African American >60     Calcium 8.7 8.6 - 10.2 mg/dL   Troponin   Result Value Ref Range    Troponin <0.01 0.00 - 0.03 ng/mL   EKG 12 Lead   Result Value Ref Range    Ventricular Rate 103 BPM    Atrial Rate 103 BPM    P-R Interval 190 ms    QRS Duration 80 ms    Q-T Interval 324 ms    QTc Calculation (Bazett) 424 ms    P Axis 48 degrees    R Axis 7 degrees    T Axis 39 degrees       Radiology:  CT Head WO Contrast   Final Result   No acute intracranial abnormality.             ------------------------- NURSING NOTES AND VITALS REVIEWED ---------------------------  Date / Time Roomed:  5/21/2021  6:01 PM  ED Bed Assignment:  07/07    The nursing notes within the ED encounter and vital signs as below have been reviewed.    /63   Pulse 107   Temp 98.6 °F (37 °C) (Temporal)   Resp 16   Ht 5' 9.5\" (1.765 m) Wt 236 lb (107 kg)   SpO2 96%   BMI 34.35 kg/m²   Oxygen Saturation Interpretation: Normal      ------------------------------------------ PROGRESS NOTES ------------------------------------------  8:39 PM EDT  I have spoken with the Patient and/or Family and discussed todays results, in addition to providing specific details for the plan of care and counseling regarding the diagnosis and prognosis. Labs and Imaging discussed with patient including appropriate follow- up and re-evaluation. Their questions are answered at this time and they are agreeable with the plan. I discussed at length with them reasons for immediate return here for re evaluation. They will followup with PCP by calling their office Next Business Day      --------------------------------- ADDITIONAL PROVIDER NOTES ---------------------------------  At this time the patient is without objective evidence of an acute process requiring hospitalization or inpatient management. They have remained hemodynamically stable throughout their entire ED visit and are stable for discharge with outpatient follow-up. The plan has been discussed in detail and they are aware of the specific conditions for emergent return, as well as the importance of follow-up. New Prescriptions    No medications on file       Diagnosis:  1. Dehydration    2. Heat exhaustion, initial encounter        Disposition:  Patient's disposition: Discharge to home  Patient's condition is stable.          Becky Wilkins DO  Resident  05/21/21 2039

## (undated) DEVICE — BLADE CLIPPER GEN PURP NS

## (undated) DEVICE — GLOVE SURG SZ 8 L12IN FNGR THK94MIL TRNSLUC YEL LTX HYDRGEL

## (undated) DEVICE — KIT,ANTI FOG,W/SPONGE & FLUID,SOFT PACK: Brand: MEDLINE

## (undated) DEVICE — ROUND TIP SCISSORS: Brand: ENDOWRIST

## (undated) DEVICE — SUCTION IRRIGATOR: Brand: ENDOWRIST

## (undated) DEVICE — SOLUTION IV IRRIG POUR BRL 0.9% SODIUM CHL 2F7124

## (undated) DEVICE — PERMANENT CAUTERY HOOK: Brand: ENDOWRIST

## (undated) DEVICE — CLIP INT M L POLYMER LOK LIG HEM O LOK

## (undated) DEVICE — TISSUE RETRIEVAL SYSTEM: Brand: INZII RETRIEVAL SYSTEM

## (undated) DEVICE — PROGRASP FORCEPS: Brand: ENDOWRIST

## (undated) DEVICE — ADHESIVE SKIN CLSR 0.7ML TOP DERMBND ADV

## (undated) DEVICE — ARM DRAPE

## (undated) DEVICE — NEEDLE HYPO 22GA L1.5IN BLK POLYPR HUB S STL REG BVL STR

## (undated) DEVICE — GOWN,SIRUS,FABRNF,XL,20/CS: Brand: MEDLINE

## (undated) DEVICE — CHLORAPREP 26ML ORANGE

## (undated) DEVICE — INSUFFLATION TUBING SET WITH FILTER, FUNNEL CONNECTOR AND LUER LOCK: Brand: JOSNOE MEDICAL INC

## (undated) DEVICE — SYRINGE 20ML LL S/C 50

## (undated) DEVICE — INTENDED FOR TISSUE SEPARATION, AND OTHER PROCEDURES THAT REQUIRE A SHARP SURGICAL BLADE TO PUNCTURE OR CUT.: Brand: BARD-PARKER ® STAINLESS STEEL BLADES

## (undated) DEVICE — ELECTRODE PT RET AD L9FT HI MOIST COND ADH HYDRGEL CORDED

## (undated) DEVICE — INSUFFLATION NEEDLE TO ESTABLISH PNEUMOPERITONEUM.: Brand: INSUFFLATION NEEDLE

## (undated) DEVICE — DRAPE,LAP,CHOLE,W/TROUGHS,STERILE: Brand: MEDLINE

## (undated) DEVICE — DOUBLE BASIN SET: Brand: MEDLINE INDUSTRIES, INC.

## (undated) DEVICE — GLOVE SURG SZ 75 L12IN FNGR THK94MIL TRNSLUC YEL LTX

## (undated) DEVICE — CADIERE FORCEPS: Brand: ENDOWRIST

## (undated) DEVICE — WARMER SCP 2 ANTIFOG LAP DISP

## (undated) DEVICE — MEDIUM-LARGE CLIP APPLIER: Brand: ENDOWRIST

## (undated) DEVICE — TOWEL,OR,DSP,ST,BLUE,STD,6/PK,12PK/CS: Brand: MEDLINE

## (undated) DEVICE — CAMERA STRYKER 1488

## (undated) DEVICE — BLADELESS OBTURATOR: Brand: WECK VISTA

## (undated) DEVICE — INSTRUMENT CLAMP TOWEL LARGE REUSABLE

## (undated) DEVICE — PACK PROCEDURE SURG GEN CUST

## (undated) DEVICE — CANNULA SEAL